# Patient Record
Sex: MALE | Race: WHITE | Employment: UNEMPLOYED | URBAN - METROPOLITAN AREA
[De-identification: names, ages, dates, MRNs, and addresses within clinical notes are randomized per-mention and may not be internally consistent; named-entity substitution may affect disease eponyms.]

---

## 2018-03-25 ENCOUNTER — HOSPITAL ENCOUNTER (EMERGENCY)
Facility: CLINIC | Age: 33
Discharge: HOME | End: 2018-03-25
Attending: EMERGENCY MEDICINE | Admitting: EMERGENCY MEDICINE

## 2018-03-25 VITALS
OXYGEN SATURATION: 99 % | BODY MASS INDEX: 29.25 KG/M2 | TEMPERATURE: 97.8 F | SYSTOLIC BLOOD PRESSURE: 183 MMHG | WEIGHT: 193 LBS | HEIGHT: 68 IN | RESPIRATION RATE: 16 BRPM | DIASTOLIC BLOOD PRESSURE: 131 MMHG

## 2018-03-25 DIAGNOSIS — R03.0 ELEVATED BLOOD PRESSURE READING WITHOUT DIAGNOSIS OF HYPERTENSION: ICD-10-CM

## 2018-03-25 DIAGNOSIS — S60.511A ABRASION OF RIGHT HAND, INITIAL ENCOUNTER: ICD-10-CM

## 2018-03-25 PROCEDURE — 99285 EMERGENCY DEPT VISIT HI MDM: CPT

## 2018-03-25 ASSESSMENT — ENCOUNTER SYMPTOMS
NERVOUS/ANXIOUS: 1
HALLUCINATIONS: 0

## 2018-03-25 NOTE — ED NOTES
Pt is noted to be pacing and wants to leave the ED. Pt's BP was noted to be very high. RN is talking to MD @ this time.

## 2018-03-25 NOTE — ED AVS SNAPSHOT
Emergency Department    [address] [place] [address]    Phone:  [phone]    Fax:  [phone]                                       Zander Blunt   MRN: [id number]    Department:   Emergency Department   Date of Visit:  3/25/2018           After Visit Summary Signature Page     I have received my discharge instructions, and my questions have been answered. I have discussed any challenges I see with this plan with the nurse or doctor.    ..........................................................................................................................................  Patient/Patient Representative Signature      ..........................................................................................................................................  Patient Representative Print Name and Relationship to Patient    ..................................................               ................................................  Date                                            Time    ..........................................................................................................................................  Reviewed by Signature/Title    ...................................................              ..............................................  Date                                                            Time

## 2018-03-25 NOTE — ED AVS SNAPSHOT
Emergency Department    [address] [facility] [address]    Phone:  [phone]    Fax:  [phone]                                       Zander Blunt   MRN: [id number]    Department:   Emergency Department   Date of Visit:  3/25/2018           Patient Information     Date Of Birth          1985        Your diagnoses for this visit were:     Abrasion of right hand, initial encounter     Elevated blood pressure reading without diagnosis of hypertension        You were seen by Sergio Saldaña DO.      Follow-up Information     Follow up with Nathaniel Russell MD In 2 days.    Specialty:  Family Practice    Contact information:    [address]  [facility] [address] [phone]          Follow up with  Emergency Department.    Specialty:  EMERGENCY MEDICINE    Why:  If symptoms worsen    Contact information:    [address] [facility] [address] [phone]        Discharge Instructions         Abrasions  Abrasions are skin scrapes. Their treatment depends on how large and deep the abrasion is.  Home care  You may be prescribed an antibiotic cream or ointment to apply to the wound. This helps prevent infection. Follow instructions when using this medicine.  General care    To care for the abrasion, do the following each day for as long as directed by your healthcare provider.    If you were given a bandage, change it once a day. If your bandage sticks to the wound, soak it in warm water until it loosens.    Wash the area with soap and warm water. You may do this in a sink or under a tub faucet or shower. Rinse off the soap. Then pat the area dry with a clean towel.    If antibiotic ointment or cream was prescribed, reapply it to the wound as directed. Cover the wound with a fresh nonstick bandage. If the bandage becomes wet or dirty, change it as soon as possible.    Some antibiotic ointments or cream can cause an allergic reaction or dermatitis.  This may cause redness, itching and or hives. If this occurs, stop using the ointment immediately and wash off any remaining ointment. You may need to take some allergy medicine to relieve symptoms.    You may use acetaminophen or ibuprofen to control pain unless another pain medicine was prescribed. Talk with your healthcare provider before using these medicines if you have chronic liver or kidney disease or ever had a stomach ulcer or GI bleeding. Don t use ibuprofen in children younger than six months old.    Most skin wounds heal within 10 days. But an infection may occur even with treatment. So it s important to watch the wound for signs of infection as listed below.  Follow-up care  Follow up with your healthcare provider, or as advised.  When to seek medical advice  Call your healthcare provider right away if any of these occur:    Fever of 100.4 F (38 C) or higher, or as directed by your healthcare provider    Increasing pain, redness, swelling, or drainage from the wound    Bleeding from the wound that does not stop after a few minutes of steady, firm pressure    Decreased ability to move any body part near the wound  Date Last Reviewed: 3/3/2017    5924-8320 [employer]. [address] [place] [address]. All rights reserved. This information is not intended as a substitute for professional medical care. Always follow your healthcare professional's instructions.      Discharge Instructions  Hypertension - High Blood Pressure    During you visit to the Emergency Department, your blood pressure was higher than the recommended blood pressure.  This may be related to stress, pain, medication or other temporary conditions. In these cases, your blood pressure may return to normal on its own. If you have a history of high blood pressure, you may need to have your provider adjust your medications. Sometimes, your high measurement here may indicate that you have developed high blood pressure  that will stay high unless it is treated. As a general rule, high blood pressure causes problems over years rather than days, weeks, or months. So, while it is important to treat blood pressure, it is rarely important to treat blood pressure immediately. Occasionally we will begin a medication in the Emergency Department; more often we will recommend close follow-up for medications with a primary doctor/clinic.    Generally, every Emergency Department visit should have a follow-up clinic visit with either a primary or a specialty clinic/provider. Please follow-up as instructed by your emergency provider today.    Return to the Emergency Department if you start to have:    A severe headache.    Chest pain.    Shortness of breath.    Weakness or numbness that affects one part of the body.    Confusion.    Vision changes.    Significant swelling of legs and/or eyes.    A reaction to any medication started in the Emergency Department.    What can I do to help myself?    Avoid alcohol.    Take any blood pressure medicine that you are prescribed.    Get a good night s sleep.    Lower your salt intake.    Exercise.    Lose weight.    Manage stress.    See your doctor regularly    If blood pressure medication was started in the Emergency Department:    The medicine may not have an immediate effect. The body and brain determine what blood pressure you have. The medicine s job is to retrain the body s  thermostat  to a lower blood pressure.    You will need to follow up with your provider to see how this medicine is working for you.  If you were given a prescription for medicine here today, be sure to read all of the information (including the package insert) that comes with your prescription.  This will include important information about the medicine, its side effects, and any warnings that you need to know about.  The pharmacist who fills the prescription can provide more information and answer questions you may have about  the medicine.  If you have questions or concerns that the pharmacist cannot address, please call or return to the Emergency Department.   Remember that you can always come back to the Emergency Department if you are not able to see your regular provider in the amount of time listed above, if you get any new symptoms, or if there is anything that worries you.      24 Hour Appointment Hotline       To make an appointment at any [facility], call [phone] ([phone]). If you don't have a family doctor or clinic, we will help you find one. [facility] are conveniently located to serve the needs of you and your family.             Review of your medicines      Our records show that you are taking the medicines listed below. If these are incorrect, please call your family doctor or clinic.        Dose / Directions Last dose taken    oxyCODONE-acetaminophen 5-325 MG per tablet   Commonly known as:  PERCOCET   Dose:  1-2 tablet   Quantity:  40 tablet        Take 1-2 tablets by mouth every 6 hours as needed for moderate to severe pain   Refills:  0                Orders Needing Specimen Collection     None      Pending Results     No orders found from 3/23/2018 to 3/26/2018.            Pending Culture Results     No orders found from 3/23/2018 to 3/26/2018.            Pending Results Instructions     If you had any lab results that were not finalized at the time of your Discharge, you can call the ED Lab Result RN at [phone]. You will be contacted by this team for any positive Lab results or changes in treatment. The nurses are available 7 days a week from 10A to 6:30P.  You can leave a message 24 hours per day and they will return your call.        Test Results From Your Hospital Stay               Clinical Quality Measure: Blood Pressure Screening     Your blood pressure was checked while you were in the emergency department today. The last reading we obtained was  BP: (!) 183/131 . Please read the  "guidelines below about what these numbers mean and what you should do about them.  If your systolic blood pressure (the top number) is less than 120 and your diastolic blood pressure (the bottom number) is less than 80, then your blood pressure is normal. There is nothing more that you need to do about it.  If your systolic blood pressure (the top number) is 120-139 or your diastolic blood pressure (the bottom number) is 80-89, your blood pressure may be higher than it should be. You should have your blood pressure rechecked within a year by a primary care provider.  If your systolic blood pressure (the top number) is 140 or greater or your diastolic blood pressure (the bottom number) is 90 or greater, you may have high blood pressure. High blood pressure is treatable, but if left untreated over time it can put you at risk for heart attack, stroke, or kidney failure. You should have your blood pressure rechecked by a primary care provider within the next 4 weeks.  If your provider in the emergency department today gave you specific instructions to follow-up with your doctor or provider even sooner than that, you should follow that instruction and not wait for up to 4 weeks for your follow-up visit.        Thank you for choosing [facility]       Thank you for choosing [facility] for your care. Our goal is always to provide you with excellent care. Hearing back from our patients is one way we can continue to improve our services. Please take a few minutes to complete the written survey that you may receive in the mail after you visit with us. Thank you!        [employer] Information     [employer] lets you send messages to your doctor, view your test results, renew your prescriptions, schedule appointments and more. To sign up, go to [email or web address] . Click on \"Log in\" on the left side of the screen, which will take you to the Welcome page. Then click on \"Sign up Now\" on the right side of the page.     You will be " asked to enter the access code listed below, as well as some personal information. Please follow the directions to create your username and password.     Your access code is: [email or web address]  Expires: 2018  1:55 AM     Your access code will  in 90 days. If you need help or a new code, please call your [facility] clinic or [phone].        Care EveryWhere ID     This is your Care EveryWhere ID. This could be used by other organizations to access your [facility] medical records  [id number]        Equal Access to Services     [facility]: Hadsheeba Deshpande, melissa sexton, carmita bundyalamanda fu, karina stauffer . So [facility] [phone].    ATENCIÓN: Si habla español, tiene a vasquez disposición servicios gratuitos de asistencia lingüística. Llame al [phone].    We comply with applicable federal civil rights laws and [place] laws. We do not discriminate on the basis of race, color, national origin, age, disability, sex, sexual orientation, or gender identity.            After Visit Summary       This is your record. Keep this with you and show to your community pharmacist(s) and doctor(s) at your next visit.
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
## 2018-03-25 NOTE — ED NOTES
"Pt asked to see staff, then states, \"I need to just go home, I'd be willing to take ativan or xanex\". MD aware.   "
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
## 2018-03-25 NOTE — ED NOTES
"Pt in hallway, \"hovering\", despite being asked to stay in room. Dr. Saldaña to bedside to eval pt.   "

## 2018-03-25 NOTE — ED NOTES
Bed: [facility]  Expected date:   Expected time:   Means of arrival:   Comments:  ems

## 2018-03-25 NOTE — DISCHARGE INSTRUCTIONS
Abrasions  Abrasions are skin scrapes. Their treatment depends on how large and deep the abrasion is.  Home care  You may be prescribed an antibiotic cream or ointment to apply to the wound. This helps prevent infection. Follow instructions when using this medicine.  General care    To care for the abrasion, do the following each day for as long as directed by your healthcare provider.    If you were given a bandage, change it once a day. If your bandage sticks to the wound, soak it in warm water until it loosens.    Wash the area with soap and warm water. You may do this in a sink or under a tub faucet or shower. Rinse off the soap. Then pat the area dry with a clean towel.    If antibiotic ointment or cream was prescribed, reapply it to the wound as directed. Cover the wound with a fresh nonstick bandage. If the bandage becomes wet or dirty, change it as soon as possible.    Some antibiotic ointments or cream can cause an allergic reaction or dermatitis. This may cause redness, itching and or hives. If this occurs, stop using the ointment immediately and wash off any remaining ointment. You may need to take some allergy medicine to relieve symptoms.    You may use acetaminophen or ibuprofen to control pain unless another pain medicine was prescribed. Talk with your healthcare provider before using these medicines if you have chronic liver or kidney disease or ever had a stomach ulcer or GI bleeding. Don t use ibuprofen in children younger than six months old.    Most skin wounds heal within 10 days. But an infection may occur even with treatment. So it s important to watch the wound for signs of infection as listed below.  Follow-up care  Follow up with your healthcare provider, or as advised.  When to seek medical advice  Call your healthcare provider right away if any of these occur:    Fever of 100.4 F (38 C) or higher, or as directed by your healthcare provider    Increasing pain, redness, swelling, or  drainage from the wound    Bleeding from the wound that does not stop after a few minutes of steady, firm pressure    Decreased ability to move any body part near the wound  Date Last Reviewed: 3/3/2017    3082-9775 [employer]. [address] [place] [address], [place] [address]. All rights reserved. This information is not intended as a substitute for professional medical care. Always follow your healthcare professional's instructions.      Discharge Instructions  Hypertension - High Blood Pressure    During you visit to the Emergency Department, your blood pressure was higher than the recommended blood pressure.  This may be related to stress, pain, medication or other temporary conditions. In these cases, your blood pressure may return to normal on its own. If you have a history of high blood pressure, you may need to have your provider adjust your medications. Sometimes, your high measurement here may indicate that you have developed high blood pressure that will stay high unless it is treated. As a general rule, high blood pressure causes problems over years rather than days, weeks, or months. So, while it is important to treat blood pressure, it is rarely important to treat blood pressure immediately. Occasionally we will begin a medication in the Emergency Department; more often we will recommend close follow-up for medications with a primary doctor/clinic.    Generally, every Emergency Department visit should have a follow-up clinic visit with either a primary or a specialty clinic/provider. Please follow-up as instructed by your emergency provider today.    Return to the Emergency Department if you start to have:    A severe headache.    Chest pain.    Shortness of breath.    Weakness or numbness that affects one part of the body.    Confusion.    Vision changes.    Significant swelling of legs and/or eyes.    A reaction to any medication started in the Emergency Department.    What can I do to help  myself?    Avoid alcohol.    Take any blood pressure medicine that you are prescribed.    Get a good night s sleep.    Lower your salt intake.    Exercise.    Lose weight.    Manage stress.    See your doctor regularly    If blood pressure medication was started in the Emergency Department:    The medicine may not have an immediate effect. The body and brain determine what blood pressure you have. The medicine s job is to retrain the body s  thermostat  to a lower blood pressure.    You will need to follow up with your provider to see how this medicine is working for you.  If you were given a prescription for medicine here today, be sure to read all of the information (including the package insert) that comes with your prescription.  This will include important information about the medicine, its side effects, and any warnings that you need to know about.  The pharmacist who fills the prescription can provide more information and answer questions you may have about the medicine.  If you have questions or concerns that the pharmacist cannot address, please call or return to the Emergency Department.   Remember that you can always come back to the Emergency Department if you are not able to see your regular provider in the amount of time listed above, if you get any new symptoms, or if there is anything that worries you.

## 2018-03-25 NOTE — ED NOTES
Pt states he is willing to change after he speaks with MD. Pt wearing white tshirt, khaki pants and white socks.

## 2018-03-25 NOTE — ED NOTES
"Pt is noted to be very tearful and is sobbing about his children. Pt is otherwise calm, cooperative and pleasant. Pt is asking \"can I take a leak\" Pt was walked to restroom via ERT. Pt is refusing to leave a urine or to have me look @ his hand where pt admitted to punching the wall with his R fist; pt is noted to have lots of dried blood. Pt is refusing all cares but is however requesting Ativan  "

## 2021-07-09 ENCOUNTER — HOSPITAL ENCOUNTER (EMERGENCY)
Facility: CLINIC | Age: 36
Discharge: HOME | End: 2021-07-09
Attending: PHYSICIAN ASSISTANT | Admitting: PHYSICIAN ASSISTANT
Payer: COMMERCIAL

## 2021-07-09 VITALS
BODY MASS INDEX: 29.12 KG/M2 | DIASTOLIC BLOOD PRESSURE: 119 MMHG | TEMPERATURE: 98.1 F | RESPIRATION RATE: 16 BRPM | SYSTOLIC BLOOD PRESSURE: 170 MMHG | HEIGHT: 72 IN | OXYGEN SATURATION: 98 % | WEIGHT: 215 LBS | HEART RATE: 72 BPM

## 2021-07-09 DIAGNOSIS — S00.252A: ICD-10-CM

## 2021-07-09 DIAGNOSIS — I10 HTN (HYPERTENSION): ICD-10-CM

## 2021-07-09 PROCEDURE — 99283 EMERGENCY DEPT VISIT LOW MDM: CPT

## 2021-07-09 PROCEDURE — 250N000009 HC RX 250: Performed by: EMERGENCY MEDICINE

## 2021-07-09 PROCEDURE — 250N000009 HC RX 250: Performed by: PHYSICIAN ASSISTANT

## 2021-07-09 PROCEDURE — 90715 TDAP VACCINE 7 YRS/> IM: CPT | Performed by: PHYSICIAN ASSISTANT

## 2021-07-09 PROCEDURE — 65210 REMOVE FOREIGN BODY FROM EYE: CPT

## 2021-07-09 PROCEDURE — 250N000011 HC RX IP 250 OP 636: Performed by: PHYSICIAN ASSISTANT

## 2021-07-09 PROCEDURE — 90471 IMMUNIZATION ADMIN: CPT | Performed by: PHYSICIAN ASSISTANT

## 2021-07-09 RX ORDER — PROPARACAINE HYDROCHLORIDE 5 MG/ML
1 SOLUTION/ DROPS OPHTHALMIC ONCE
Status: COMPLETED | OUTPATIENT
Start: 2021-07-09 | End: 2021-07-09

## 2021-07-09 RX ADMIN — CLOSTRIDIUM TETANI TOXOID ANTIGEN (FORMALDEHYDE INACTIVATED), CORYNEBACTERIUM DIPHTHERIAE TOXOID ANTIGEN (FORMALDEHYDE INACTIVATED), BORDETELLA PERTUSSIS TOXOID ANTIGEN (GLUTARALDEHYDE INACTIVATED), BORDETELLA PERTUSSIS FILAMENTOUS HEMAGGLUTININ ANTIGEN (FORMALDEHYDE INACTIVATED), BORDETELLA PERTUSSIS PERTACTIN ANTIGEN, AND BORDETELLA PERTUSSIS FIMBRIAE 2/3 ANTIGEN 0.5 ML: 5; 2; 2.5; 5; 3; 5 INJECTION, SUSPENSION INTRAMUSCULAR at 11:03

## 2021-07-09 RX ADMIN — FLUORESCEIN SODIUM 600 MCG: 0.6 STRIP OPHTHALMIC at 10:20

## 2021-07-09 RX ADMIN — PROPARACAINE HYDROCHLORIDE 1 DROP: 5 SOLUTION/ DROPS OPHTHALMIC at 09:47

## 2021-07-09 ASSESSMENT — VISUAL ACUITY
OD: 20/30
OS: 20/60

## 2021-07-09 ASSESSMENT — MIFFLIN-ST. JEOR: SCORE: 1938.23

## 2021-07-09 ASSESSMENT — ENCOUNTER SYMPTOMS
EYE PAIN: 1
EYE REDNESS: 1

## 2021-07-09 NOTE — ED PROVIDER NOTES
Emergency Department Attending Supervision Note  7/9/2021  10:46 AM      I evaluated this patient in conjunction with Brendon Pineda PA-C      Briefly, the patient presented with discomfort to his left lower eyelid after getting debris into his left eye while cutting tiles with a skill saw yesterday.  He flushed his eye at the time but has had significant discomfort.    On my exam, there is an extremely small light-colored foreign body in the conjunctiva of the left inferolateral eyelid, which cannot be removed on exam.  No evidence of open globe.  No fluorescein uptake.  Visual acuity reported to me as 20/30 in the right and 20/60 in the left.    My impression is that he would benefit from prompt evaluation by an ophthalmologist.  Both RAÚL Campbell, and I attempted to remove what seems to be the small foreign body without success.  I used a 27-gauge needle with a lateral approach.  I explained to the patient that I felt further attempts at emergency department foreign body removal were not appropriate out of concern for causing iatrogenic injury.  Instead, we spoke with the patient's ophthalmology clinic who was able to see him early this afternoon for more comprehensive eye assessment.  He agrees with this plan and was discharged.      Diagnosis    ICD-10-CM    1. Acute foreign body of eyelid, left, initial encounter  S00.252A    2. HTN (hypertension)  I10          Carlos Manuel Nails MD    This note was completed in part using Dragon voice recognition software. Although reviewed after completion, some word and grammatical errors may occur.           Donnell aNils MD  07/09/21 1606

## 2021-07-09 NOTE — ED PROVIDER NOTES
History   Chief Complaint:  Eye Pain     HPI   Zander Blunt is a 35 year old male who presents to the ED with left eye pain and blurry vision since yesterday after getting a possible foreign body into the eye while cutting some tiles with a skill saw. Per chart review, the patient saw an ophthalmologist earlier this morning where he reported that his girlfriend was able to remove something from that eye after the incident. The pain has been so intense that he had difficulty sleeping last night. Here, the pain is noted to resolve with Alcaine. Other than some redness and watering, he otherwise feels well and does not have any other complaints at this time. Visual acuity here is 20/30 in the right and 20/60 in the left. Per immunization records, his last tetanus shot was in 2011.     Review of Systems   Eyes: Positive for pain (left), redness (left) and visual disturbance (blurry vision).   All other systems reviewed and are negative.    Allergies:  Quetiapine    Medications:  Albuterol inhaler  Desyrel  Wellbutrin  Xanax    Past Medical History:    Acute renal failure  ADHD  Adjustment disorder  Anxiety  Cellulitis of right leg  Chemical dependency  Depression  External hemorrhoid   Hypertension  Iron deficiency anemia  Panic disorder  Rhabdomyolosis  Substance abuse    Past Surgical History:    Flexible sigmoidoscopy  Right foot glass removal  ORIF right ankle  ORIF right arm     Family History:    Hypertension   Stroke    Social History:  The patient presented alone.    Physical Exam     Patient Vitals for the past 24 hrs:   BP Temp Temp src Pulse Resp SpO2 Height Weight   07/09/21 1100 (!) 170/119 -- -- 72 -- 98 % -- --   07/09/21 0943 (!) 177/126 98.1  F (36.7  C) Oral 85 16 97 % 1.829 m (6') 97.5 kg (215 lb)       Physical Exam  Constitutional: Pleasant. Cooperative. Non-toxic appearing.  HEENT: Pupils equally round and reactive. EOMI. Mild left conjunctival injection. No discharge. Lids everted, no foreign  bodies seen. Mild L lower lid edema. Normal anterior chamber without cells or flare. Fluorescein exam reveals no uptake, no dendritic lesions. Negative Vinay sign. VA 20/30 in right eye, 20/60 in left eye. There does appear to be a white foreign body noted in L lower lateral lid.  Respiratory: Normal respiratory effort, lungs are clear bilaterally.  Skin: Normal, without rash. No periorbital erythema.  Neurologic: Cranial nerves grossly intact. Alert.  Nursing notes and vital signs reviewed.     Emergency Department Course     Emergency Department Course:    Reviewed:  1009 I reviewed nursing notes, vitals and past medical history.    Assessments:  1014 I obtained history and examined the patient as noted above.   1048 Dr. Nails evaluated the patient in conjunction with me.  1109 I rechecked the patient and discussed his upcoming ophthalmology appointment.    Consults:   1055 I spoke with [place] [id number] [facility] regarding care coordination.    Interventions:  0947: Alcaine 0.5% opthalmic solution 1 drop  1020: Fluorescein 600 mcg   1103: Adacel 0.5 mL IM    Disposition:  The patient was discharged to home.       Impression & Plan   Medical Decision Making:  Zander Blunt is a 35 year old male who presents to the ED for evaluation of eye pain. Patient reports that he was using a tile saw yesterday and felt foreign body enter his left eye.  He has since had pain since then, causing difficulty sleeping secondary to pain as well as some vision changes.  See HPI as above for additional details.  Vitals and physical exam as above.  Slit-lamp exam performed without evidence for corneal abrasion on fluorescein exam.  There is evidence for a small white speck noted to his left lower lateral eyelid.  An attempt was made to remove this foreign body without success.  Given persistence of symptoms and inability to remove foreign body, ophthalmology consult was indicated.  Patient in agreement.  I did discuss the  case with ophthalmology clinic, who will see patient at 1300 today.  Patient in agreement with this plan.  Tetanus updated.  We did discuss patient's marked hypertension.  This appears to be an ongoing issue, and patient states this to be the case.  Advised and follow-up with PCP with respect to this.  Patient reports understanding. Discussed reasons to return. All questions answered. Patient discharged to home in stable condition.    Diagnosis:    ICD-10-CM    1. Acute foreign body of eyelid, left, initial encounter  S00.252A    2. HTN (hypertension)  I10        Discharge Medications:  New Prescriptions    No medications on file       Scribe Disclosure:  I, Kasia Cooper, am serving as a scribe at 10:11 AM on 7/9/2021 to document services personally performed by Brendon Pineda PA-C based on my observations and the provider's statements to me.     This record was created at least in part using electronic voice recognition software, so please excuse any typographical errors.               Brendon Pineda PA-C  07/09/21 1130

## 2021-09-22 ENCOUNTER — LAB REQUISITION (OUTPATIENT)
Dept: LAB | Facility: CLINIC | Age: 36
End: 2021-09-22
Payer: COMMERCIAL

## 2021-09-22 DIAGNOSIS — M79.673 PAIN IN UNSPECIFIED FOOT: ICD-10-CM

## 2021-09-22 LAB
APPEARANCE FLD: ABNORMAL
COLOR FLD: YELLOW
CRYSTALS SNV MICRO: NORMAL
LYMPHOCYTES NFR FLD MANUAL: 1 %
MONOS+MACROS NFR FLD MANUAL: 1 %
NEUTS BAND NFR FLD MANUAL: 98 %
WBC # FLD AUTO: ABNORMAL /UL

## 2021-09-22 PROCEDURE — 87186 SC STD MICRODIL/AGAR DIL: CPT | Mod: ORL | Performed by: ORTHOPAEDIC SURGERY

## 2021-09-22 PROCEDURE — 89060 EXAM SYNOVIAL FLUID CRYSTALS: CPT | Mod: ORL | Performed by: ORTHOPAEDIC SURGERY

## 2021-09-22 PROCEDURE — 89051 BODY FLUID CELL COUNT: CPT | Mod: ORL | Performed by: ORTHOPAEDIC SURGERY

## 2021-09-22 PROCEDURE — 87075 CULTR BACTERIA EXCEPT BLOOD: CPT | Mod: ORL | Performed by: ORTHOPAEDIC SURGERY

## 2021-09-23 ENCOUNTER — ANESTHESIA EVENT (OUTPATIENT)
Dept: SURGERY | Facility: CLINIC | Age: 36
End: 2021-09-23
Payer: COMMERCIAL

## 2021-09-23 ENCOUNTER — ANESTHESIA (OUTPATIENT)
Dept: SURGERY | Facility: CLINIC | Age: 36
End: 2021-09-23
Payer: COMMERCIAL

## 2021-09-23 ENCOUNTER — HOSPITAL ENCOUNTER (INPATIENT)
Facility: CLINIC | Age: 36
LOS: 17 days | Discharge: HOME | End: 2021-10-10
Attending: EMERGENCY MEDICINE | Admitting: INTERNAL MEDICINE
Payer: COMMERCIAL

## 2021-09-23 DIAGNOSIS — M00.071 STAPHYLOCOCCAL ARTHRITIS OF RIGHT FOOT (H): Primary | ICD-10-CM

## 2021-09-23 LAB
ANION GAP SERPL CALCULATED.3IONS-SCNC: 2 MMOL/L (ref 3–14)
BUN SERPL-MCNC: 11 MG/DL (ref 7–30)
CALCIUM SERPL-MCNC: 9.1 MG/DL (ref 8.5–10.1)
CHLORIDE BLD-SCNC: 105 MMOL/L (ref 94–109)
CO2 SERPL-SCNC: 30 MMOL/L (ref 20–32)
CREAT SERPL-MCNC: 0.78 MG/DL (ref 0.66–1.25)
CRP SERPL-MCNC: 104 MG/L (ref 0–8)
ERYTHROCYTE [DISTWIDTH] IN BLOOD BY AUTOMATED COUNT: 13 % (ref 10–15)
ERYTHROCYTE [SEDIMENTATION RATE] IN BLOOD BY WESTERGREN METHOD: 78 MM/HR (ref 0–15)
GFR SERPL CREATININE-BSD FRML MDRD: >90 ML/MIN/1.73M2
GLUCOSE BLD-MCNC: 115 MG/DL (ref 70–99)
GRAM STAIN RESULT: ABNORMAL
GRAM STAIN RESULT: ABNORMAL
HCT VFR BLD AUTO: 37.4 % (ref 40–53)
HGB BLD-MCNC: 12.3 G/DL (ref 13.3–17.7)
MCH RBC QN AUTO: 27.8 PG (ref 26.5–33)
MCHC RBC AUTO-ENTMCNC: 32.9 G/DL (ref 31.5–36.5)
MCV RBC AUTO: 84 FL (ref 78–100)
PLATELET # BLD AUTO: 252 10E3/UL (ref 150–450)
POTASSIUM BLD-SCNC: 4.1 MMOL/L (ref 3.4–5.3)
RBC # BLD AUTO: 4.43 10E6/UL (ref 4.4–5.9)
SARS-COV-2 RNA RESP QL NAA+PROBE: NEGATIVE
SODIUM SERPL-SCNC: 137 MMOL/L (ref 133–144)
WBC # BLD AUTO: 5.8 10E3/UL (ref 4–11)

## 2021-09-23 PROCEDURE — 250N000011 HC RX IP 250 OP 636: Performed by: ANESTHESIOLOGY

## 2021-09-23 PROCEDURE — 250N000009 HC RX 250: Performed by: ORTHOPAEDIC SURGERY

## 2021-09-23 PROCEDURE — 250N000011 HC RX IP 250 OP 636: Performed by: NURSE ANESTHETIST, CERTIFIED REGISTERED

## 2021-09-23 PROCEDURE — 87070 CULTURE OTHR SPECIMN AEROBIC: CPT | Performed by: ORTHOPAEDIC SURGERY

## 2021-09-23 PROCEDURE — 250N000011 HC RX IP 250 OP 636: Performed by: ORTHOPAEDIC SURGERY

## 2021-09-23 PROCEDURE — 250N000013 HC RX MED GY IP 250 OP 250 PS 637: Performed by: ORTHOPAEDIC SURGERY

## 2021-09-23 PROCEDURE — C9803 HOPD COVID-19 SPEC COLLECT: HCPCS

## 2021-09-23 PROCEDURE — 360N000075 HC SURGERY LEVEL 2, PER MIN: Performed by: ORTHOPAEDIC SURGERY

## 2021-09-23 PROCEDURE — 258N000003 HC RX IP 258 OP 636: Performed by: NURSE ANESTHETIST, CERTIFIED REGISTERED

## 2021-09-23 PROCEDURE — 99285 EMERGENCY DEPT VISIT HI MDM: CPT

## 2021-09-23 PROCEDURE — 999N000141 HC STATISTIC PRE-PROCEDURE NURSING ASSESSMENT: Performed by: ORTHOPAEDIC SURGERY

## 2021-09-23 PROCEDURE — 87635 SARS-COV-2 COVID-19 AMP PRB: CPT | Performed by: EMERGENCY MEDICINE

## 2021-09-23 PROCEDURE — 250N000025 HC SEVOFLURANE, PER MIN: Performed by: ORTHOPAEDIC SURGERY

## 2021-09-23 PROCEDURE — 85652 RBC SED RATE AUTOMATED: CPT | Performed by: ORTHOPAEDIC SURGERY

## 2021-09-23 PROCEDURE — 250N000009 HC RX 250: Performed by: NURSE ANESTHETIST, CERTIFIED REGISTERED

## 2021-09-23 PROCEDURE — 85014 HEMATOCRIT: CPT | Performed by: ORTHOPAEDIC SURGERY

## 2021-09-23 PROCEDURE — 87075 CULTR BACTERIA EXCEPT BLOOD: CPT | Performed by: ORTHOPAEDIC SURGERY

## 2021-09-23 PROCEDURE — 36415 COLL VENOUS BLD VENIPUNCTURE: CPT | Performed by: ORTHOPAEDIC SURGERY

## 2021-09-23 PROCEDURE — 120N000001 HC R&B MED SURG/OB

## 2021-09-23 PROCEDURE — 370N000017 HC ANESTHESIA TECHNICAL FEE, PER MIN: Performed by: ORTHOPAEDIC SURGERY

## 2021-09-23 PROCEDURE — 80048 BASIC METABOLIC PNL TOTAL CA: CPT | Performed by: ORTHOPAEDIC SURGERY

## 2021-09-23 PROCEDURE — 272N000001 HC OR GENERAL SUPPLY STERILE: Performed by: ORTHOPAEDIC SURGERY

## 2021-09-23 PROCEDURE — 0SBM0ZZ EXCISION OF RIGHT METATARSAL-PHALANGEAL JOINT, OPEN APPROACH: ICD-10-PCS | Performed by: ORTHOPAEDIC SURGERY

## 2021-09-23 PROCEDURE — 86140 C-REACTIVE PROTEIN: CPT | Performed by: ORTHOPAEDIC SURGERY

## 2021-09-23 PROCEDURE — 258N000001 HC RX 258: Performed by: ORTHOPAEDIC SURGERY

## 2021-09-23 PROCEDURE — 87040 BLOOD CULTURE FOR BACTERIA: CPT | Performed by: ORTHOPAEDIC SURGERY

## 2021-09-23 PROCEDURE — 250N000013 HC RX MED GY IP 250 OP 250 PS 637: Performed by: INTERNAL MEDICINE

## 2021-09-23 PROCEDURE — 87205 SMEAR GRAM STAIN: CPT | Performed by: ORTHOPAEDIC SURGERY

## 2021-09-23 PROCEDURE — 710N000009 HC RECOVERY PHASE 1, LEVEL 1, PER MIN: Performed by: ORTHOPAEDIC SURGERY

## 2021-09-23 RX ORDER — PROPOFOL 10 MG/ML
INJECTION, EMULSION INTRAVENOUS PRN
Status: DISCONTINUED | OUTPATIENT
Start: 2021-09-23 | End: 2021-09-23

## 2021-09-23 RX ORDER — ONDANSETRON 4 MG/1
4 TABLET, ORALLY DISINTEGRATING ORAL EVERY 6 HOURS PRN
Status: DISCONTINUED | OUTPATIENT
Start: 2021-09-23 | End: 2021-09-23

## 2021-09-23 RX ORDER — SODIUM CHLORIDE, SODIUM LACTATE, POTASSIUM CHLORIDE, CALCIUM CHLORIDE 600; 310; 30; 20 MG/100ML; MG/100ML; MG/100ML; MG/100ML
INJECTION, SOLUTION INTRAVENOUS CONTINUOUS
Status: DISCONTINUED | OUTPATIENT
Start: 2021-09-23 | End: 2021-09-23

## 2021-09-23 RX ORDER — DIPHENHYDRAMINE HCL 25 MG
25 CAPSULE ORAL EVERY 6 HOURS PRN
Status: DISCONTINUED | OUTPATIENT
Start: 2021-09-23 | End: 2021-10-10 | Stop reason: HOSPADM

## 2021-09-23 RX ORDER — ONDANSETRON 2 MG/ML
INJECTION INTRAMUSCULAR; INTRAVENOUS PRN
Status: DISCONTINUED | OUTPATIENT
Start: 2021-09-23 | End: 2021-09-23

## 2021-09-23 RX ORDER — ONDANSETRON 2 MG/ML
4 INJECTION INTRAMUSCULAR; INTRAVENOUS EVERY 30 MIN PRN
Status: DISCONTINUED | OUTPATIENT
Start: 2021-09-23 | End: 2021-09-23 | Stop reason: HOSPADM

## 2021-09-23 RX ORDER — FENTANYL CITRATE 50 UG/ML
50 INJECTION, SOLUTION INTRAMUSCULAR; INTRAVENOUS ONCE
Status: COMPLETED | OUTPATIENT
Start: 2021-09-23 | End: 2021-09-23

## 2021-09-23 RX ORDER — MEPERIDINE HYDROCHLORIDE 25 MG/ML
12.5 INJECTION INTRAMUSCULAR; INTRAVENOUS; SUBCUTANEOUS EVERY 5 MIN PRN
Status: DISCONTINUED | OUTPATIENT
Start: 2021-09-23 | End: 2021-09-23 | Stop reason: HOSPADM

## 2021-09-23 RX ORDER — ONDANSETRON 4 MG/1
4 TABLET, ORALLY DISINTEGRATING ORAL EVERY 6 HOURS PRN
Status: DISCONTINUED | OUTPATIENT
Start: 2021-09-23 | End: 2021-10-10 | Stop reason: HOSPADM

## 2021-09-23 RX ORDER — BUPIVACAINE HYDROCHLORIDE 2.5 MG/ML
INJECTION, SOLUTION INFILTRATION; PERINEURAL PRN
Status: DISCONTINUED | OUTPATIENT
Start: 2021-09-23 | End: 2021-09-23 | Stop reason: HOSPADM

## 2021-09-23 RX ORDER — LABETALOL HYDROCHLORIDE 5 MG/ML
5 INJECTION, SOLUTION INTRAVENOUS EVERY 5 MIN PRN
Status: DISCONTINUED | OUTPATIENT
Start: 2021-09-23 | End: 2021-09-23 | Stop reason: HOSPADM

## 2021-09-23 RX ORDER — HYDROXYZINE HYDROCHLORIDE 25 MG/1
25 TABLET, FILM COATED ORAL EVERY 6 HOURS PRN
Status: DISCONTINUED | OUTPATIENT
Start: 2021-09-23 | End: 2021-09-23 | Stop reason: HOSPADM

## 2021-09-23 RX ORDER — CEFAZOLIN SODIUM 2 G/100ML
2 INJECTION, SOLUTION INTRAVENOUS EVERY 8 HOURS
Status: COMPLETED | OUTPATIENT
Start: 2021-09-24 | End: 2021-09-24

## 2021-09-23 RX ORDER — CEFAZOLIN SODIUM 2 G/100ML
2 INJECTION, SOLUTION INTRAVENOUS
Status: DISCONTINUED | OUTPATIENT
Start: 2021-09-23 | End: 2021-09-23 | Stop reason: HOSPADM

## 2021-09-23 RX ORDER — OXYCODONE HYDROCHLORIDE 5 MG/1
5 TABLET ORAL EVERY 4 HOURS PRN
Status: DISCONTINUED | OUTPATIENT
Start: 2021-09-23 | End: 2021-09-23 | Stop reason: HOSPADM

## 2021-09-23 RX ORDER — ACETAMINOPHEN 325 MG/1
975 TABLET ORAL EVERY 8 HOURS
Status: DISCONTINUED | OUTPATIENT
Start: 2021-09-23 | End: 2021-09-23

## 2021-09-23 RX ORDER — LIDOCAINE HYDROCHLORIDE 20 MG/ML
INJECTION, SOLUTION INFILTRATION; PERINEURAL PRN
Status: DISCONTINUED | OUTPATIENT
Start: 2021-09-23 | End: 2021-09-23

## 2021-09-23 RX ORDER — ALPRAZOLAM 1 MG
1 TABLET ORAL 2 TIMES DAILY PRN
COMMUNITY

## 2021-09-23 RX ORDER — IBUPROFEN 600 MG/1
600 TABLET, FILM COATED ORAL EVERY 6 HOURS PRN
Status: DISCONTINUED | OUTPATIENT
Start: 2021-09-23 | End: 2021-10-10 | Stop reason: HOSPADM

## 2021-09-23 RX ORDER — CEFAZOLIN SODIUM 2 G/100ML
2 INJECTION, SOLUTION INTRAVENOUS SEE ADMIN INSTRUCTIONS
Status: DISCONTINUED | OUTPATIENT
Start: 2021-09-23 | End: 2021-09-23 | Stop reason: HOSPADM

## 2021-09-23 RX ORDER — SODIUM CHLORIDE, SODIUM LACTATE, POTASSIUM CHLORIDE, CALCIUM CHLORIDE 600; 310; 30; 20 MG/100ML; MG/100ML; MG/100ML; MG/100ML
INJECTION, SOLUTION INTRAVENOUS CONTINUOUS
Status: DISCONTINUED | OUTPATIENT
Start: 2021-09-23 | End: 2021-09-23 | Stop reason: HOSPADM

## 2021-09-23 RX ORDER — ONDANSETRON 2 MG/ML
4 INJECTION INTRAMUSCULAR; INTRAVENOUS EVERY 6 HOURS PRN
Status: DISCONTINUED | OUTPATIENT
Start: 2021-09-23 | End: 2021-10-10 | Stop reason: HOSPADM

## 2021-09-23 RX ORDER — BISACODYL 10 MG
10 SUPPOSITORY, RECTAL RECTAL DAILY PRN
Status: DISCONTINUED | OUTPATIENT
Start: 2021-09-23 | End: 2021-10-10 | Stop reason: HOSPADM

## 2021-09-23 RX ORDER — DOXYCYCLINE HYCLATE 100 MG
100 TABLET ORAL 2 TIMES DAILY
Status: ON HOLD | COMMUNITY
End: 2021-09-23

## 2021-09-23 RX ORDER — AMOXICILLIN 250 MG
1 CAPSULE ORAL 2 TIMES DAILY
Status: DISCONTINUED | OUTPATIENT
Start: 2021-09-23 | End: 2021-10-10 | Stop reason: HOSPADM

## 2021-09-23 RX ORDER — FENTANYL CITRATE 50 UG/ML
INJECTION, SOLUTION INTRAMUSCULAR; INTRAVENOUS PRN
Status: DISCONTINUED | OUTPATIENT
Start: 2021-09-23 | End: 2021-09-23

## 2021-09-23 RX ORDER — TRAZODONE HYDROCHLORIDE 50 MG/1
50-150 TABLET, FILM COATED ORAL
COMMUNITY

## 2021-09-23 RX ORDER — PROCHLORPERAZINE MALEATE 5 MG
10 TABLET ORAL EVERY 6 HOURS PRN
Status: DISCONTINUED | OUTPATIENT
Start: 2021-09-23 | End: 2021-10-10 | Stop reason: HOSPADM

## 2021-09-23 RX ORDER — ONDANSETRON 4 MG/1
4 TABLET, ORALLY DISINTEGRATING ORAL EVERY 30 MIN PRN
Status: DISCONTINUED | OUTPATIENT
Start: 2021-09-23 | End: 2021-09-23 | Stop reason: HOSPADM

## 2021-09-23 RX ORDER — ALBUTEROL SULFATE 90 UG/1
1-2 AEROSOL, METERED RESPIRATORY (INHALATION) EVERY 4 HOURS PRN
COMMUNITY

## 2021-09-23 RX ORDER — SODIUM CHLORIDE, SODIUM LACTATE, POTASSIUM CHLORIDE, CALCIUM CHLORIDE 600; 310; 30; 20 MG/100ML; MG/100ML; MG/100ML; MG/100ML
INJECTION, SOLUTION INTRAVENOUS CONTINUOUS PRN
Status: DISCONTINUED | OUTPATIENT
Start: 2021-09-23 | End: 2021-09-23

## 2021-09-23 RX ORDER — LIDOCAINE 40 MG/G
CREAM TOPICAL
Status: DISCONTINUED | OUTPATIENT
Start: 2021-09-23 | End: 2021-09-23

## 2021-09-23 RX ORDER — ALBUTEROL SULFATE 90 UG/1
1-2 AEROSOL, METERED RESPIRATORY (INHALATION) EVERY 4 HOURS PRN
Status: DISCONTINUED | OUTPATIENT
Start: 2021-09-23 | End: 2021-10-10 | Stop reason: HOSPADM

## 2021-09-23 RX ORDER — DEXAMETHASONE SODIUM PHOSPHATE 4 MG/ML
INJECTION, SOLUTION INTRA-ARTICULAR; INTRALESIONAL; INTRAMUSCULAR; INTRAVENOUS; SOFT TISSUE PRN
Status: DISCONTINUED | OUTPATIENT
Start: 2021-09-23 | End: 2021-09-23

## 2021-09-23 RX ORDER — ONDANSETRON 2 MG/ML
4 INJECTION INTRAMUSCULAR; INTRAVENOUS EVERY 6 HOURS PRN
Status: DISCONTINUED | OUTPATIENT
Start: 2021-09-23 | End: 2021-09-23

## 2021-09-23 RX ORDER — ACETAMINOPHEN 325 MG/1
650 TABLET ORAL EVERY 4 HOURS PRN
Status: DISCONTINUED | OUTPATIENT
Start: 2021-09-26 | End: 2021-10-10 | Stop reason: HOSPADM

## 2021-09-23 RX ORDER — FENTANYL CITRATE 0.05 MG/ML
50 INJECTION, SOLUTION INTRAMUSCULAR; INTRAVENOUS EVERY 5 MIN PRN
Status: DISCONTINUED | OUTPATIENT
Start: 2021-09-23 | End: 2021-09-23 | Stop reason: HOSPADM

## 2021-09-23 RX ORDER — KETOROLAC TROMETHAMINE 15 MG/ML
30 INJECTION, SOLUTION INTRAMUSCULAR; INTRAVENOUS EVERY 6 HOURS PRN
Status: DISCONTINUED | OUTPATIENT
Start: 2021-09-23 | End: 2021-09-23

## 2021-09-23 RX ORDER — ALPRAZOLAM 1 MG
1 TABLET ORAL 2 TIMES DAILY PRN
Status: DISCONTINUED | OUTPATIENT
Start: 2021-09-23 | End: 2021-09-29

## 2021-09-23 RX ORDER — HYDROMORPHONE HYDROCHLORIDE 1 MG/ML
0.3 INJECTION, SOLUTION INTRAMUSCULAR; INTRAVENOUS; SUBCUTANEOUS EVERY 5 MIN PRN
Status: DISCONTINUED | OUTPATIENT
Start: 2021-09-23 | End: 2021-09-23 | Stop reason: HOSPADM

## 2021-09-23 RX ORDER — TRAZODONE HYDROCHLORIDE 50 MG/1
50-150 TABLET, FILM COATED ORAL
Status: DISCONTINUED | OUTPATIENT
Start: 2021-09-23 | End: 2021-10-10 | Stop reason: HOSPADM

## 2021-09-23 RX ORDER — POLYETHYLENE GLYCOL 3350 17 G/17G
17 POWDER, FOR SOLUTION ORAL DAILY
Status: DISCONTINUED | OUTPATIENT
Start: 2021-09-24 | End: 2021-10-10 | Stop reason: HOSPADM

## 2021-09-23 RX ORDER — DOXYCYCLINE 100 MG/1
100 CAPSULE ORAL 2 TIMES DAILY
Status: ON HOLD | COMMUNITY
End: 2021-09-26

## 2021-09-23 RX ORDER — ACETAMINOPHEN 325 MG/1
975 TABLET ORAL EVERY 8 HOURS
Status: DISCONTINUED | OUTPATIENT
Start: 2021-09-23 | End: 2021-10-10 | Stop reason: HOSPADM

## 2021-09-23 RX ORDER — MAGNESIUM HYDROXIDE 1200 MG/15ML
LIQUID ORAL PRN
Status: DISCONTINUED | OUTPATIENT
Start: 2021-09-23 | End: 2021-09-23 | Stop reason: HOSPADM

## 2021-09-23 RX ORDER — METHOCARBAMOL 750 MG/1
750 TABLET, FILM COATED ORAL EVERY 6 HOURS PRN
Status: DISCONTINUED | OUTPATIENT
Start: 2021-09-23 | End: 2021-10-10 | Stop reason: HOSPADM

## 2021-09-23 RX ORDER — LIDOCAINE 40 MG/G
CREAM TOPICAL
Status: DISCONTINUED | OUTPATIENT
Start: 2021-09-23 | End: 2021-10-10 | Stop reason: HOSPADM

## 2021-09-23 RX ADMIN — PROPOFOL 350 MG: 10 INJECTION, EMULSION INTRAVENOUS at 19:36

## 2021-09-23 RX ADMIN — MIDAZOLAM 2 MG: 1 INJECTION INTRAMUSCULAR; INTRAVENOUS at 19:28

## 2021-09-23 RX ADMIN — ONDANSETRON 4 MG: 2 INJECTION INTRAMUSCULAR; INTRAVENOUS at 19:48

## 2021-09-23 RX ADMIN — FENTANYL CITRATE 100 MCG: 50 INJECTION, SOLUTION INTRAMUSCULAR; INTRAVENOUS at 19:36

## 2021-09-23 RX ADMIN — SODIUM CHLORIDE, POTASSIUM CHLORIDE, SODIUM LACTATE AND CALCIUM CHLORIDE: 600; 310; 30; 20 INJECTION, SOLUTION INTRAVENOUS at 19:28

## 2021-09-23 RX ADMIN — FENTANYL CITRATE 50 MCG: 50 INJECTION, SOLUTION INTRAMUSCULAR; INTRAVENOUS at 20:25

## 2021-09-23 RX ADMIN — CEFAZOLIN SODIUM 2 G: 2 INJECTION, SOLUTION INTRAVENOUS at 19:46

## 2021-09-23 RX ADMIN — SUCCINYLCHOLINE CHLORIDE 100 MG: 20 INJECTION, SOLUTION INTRAMUSCULAR; INTRAVENOUS; PARENTERAL at 19:36

## 2021-09-23 RX ADMIN — SENNOSIDES AND DOCUSATE SODIUM 1 TABLET: 8.6; 5 TABLET ORAL at 22:56

## 2021-09-23 RX ADMIN — LIDOCAINE HYDROCHLORIDE 100 MG: 20 INJECTION, SOLUTION INFILTRATION; PERINEURAL at 19:36

## 2021-09-23 RX ADMIN — ACETAMINOPHEN 975 MG: 325 TABLET, FILM COATED ORAL at 22:56

## 2021-09-23 RX ADMIN — DEXAMETHASONE SODIUM PHOSPHATE 4 MG: 4 INJECTION, SOLUTION INTRA-ARTICULAR; INTRALESIONAL; INTRAMUSCULAR; INTRAVENOUS; SOFT TISSUE at 19:42

## 2021-09-23 RX ADMIN — FENTANYL CITRATE 50 MCG: 50 INJECTION, SOLUTION INTRAMUSCULAR; INTRAVENOUS at 20:49

## 2021-09-23 ASSESSMENT — COPD QUESTIONNAIRES: COPD: 0

## 2021-09-23 NOTE — ANESTHESIA PREPROCEDURE EVALUATION
Anesthesia Pre-Procedure Evaluation    Patient: Zander Blunt   MRN: [id number] : 1985        Preoperative Diagnosis: Infection of bone, lower leg (H) [M86.9]   Procedure : Procedure(s):  IRRIGATION AND DEBRIDEMENT, RIGHT FIRST METATARSAL PHALANGEAL JOINT     No past medical history on file.   No past surgical history on file.   Allergies   Allergen Reactions     Gluten Meal Other (See Comments) and Cramps     Asthma       Social History     Tobacco Use     Smoking status: Former Smoker     Quit date: 2006     Years since quitting: 15.5     Smokeless tobacco: Never Used   Substance Use Topics     Alcohol use: No      Wt Readings from Last 1 Encounters:   21 97.5 kg (215 lb)        Anesthesia Evaluation            ROS/MED HX  ENT/Pulmonary:    (-) COPD and sleep apnea   Neurologic:       Cardiovascular:     (+) hypertension-----    METS/Exercise Tolerance:     Hematologic:       Musculoskeletal:       GI/Hepatic:    (-) GERD   Renal/Genitourinary:     (+) renal disease (in past), type: ARF,     Endo:       Psychiatric/Substance Use: Comment: Panic disorder  Substance disorder  adhd    (+) psychiatric history anxiety and depression     Infectious Disease:       Malignancy:       Other:            Physical Exam    Airway        Mallampati: I   TM distance: > 3 FB   Neck ROM: full     Respiratory Devices and Support         Dental  no notable dental history         Cardiovascular   cardiovascular exam normal          Pulmonary           breath sounds clear to auscultation           OUTSIDE LABS:  CBC:   Lab Results   Component Value Date    WBC 4.0 2014    WBC 4.3 2014    HGB 12.0 (L) 2014    HGB 10.3 (L) 2014    HCT 37.8 (L) 2014    HCT 32.7 (L) 2014     2014     2014     BMP:   Lab Results   Component Value Date     2014    POTASSIUM 4.4 2014    CHLORIDE 103 2014    CO2 26 2014    BUN 10 2014    CR  0.84 02/24/2014    GLC 82 02/24/2014     COAGS: No results found for: PTT, INR, FIBR  POC: No results found for: BGM, HCG, HCGS  HEPATIC:   Lab Results   Component Value Date    ALBUMIN 4.1 02/24/2014    PROTTOTAL 7.0 02/24/2014    ALT 54 02/24/2014    AST 37 02/24/2014    ALKPHOS 70 02/24/2014    BILITOTAL 0.2 02/24/2014     OTHER:   Lab Results   Component Value Date    CAIT 9.4 02/24/2014       Anesthesia Plan    ASA Status:  2, emergent       Anesthesia Type: General.     - Airway: ETT              Consents    Anesthesia Plan(s) and associated risks, benefits, and realistic alternatives discussed. Questions answered and patient/representative(s) expressed understanding.     - Discussed with:  Patient         Postoperative Care       PONV prophylaxis: Ondansetron (or other 5HT-3)     Comments:                Ebony Stout

## 2021-09-23 NOTE — CONSULTS
[facility]    Orthopedics Consultation    Date of Admission:  9/23/2021    Assessment & Plan   Zander Blunt is a 35 year old male who was admitted on 9/23/2021. I was asked to see the patient for right foot pain. He has a septic right first metatarsophalangeal joint.     Zander was previously seen by my partner Dr. Peterson in clinic and he was aspirated yesterday. He initially thought this was a gout flare and injected cortisone after the aspiration. He rightfully sent the fluid culture and it showed a cell count of 389,000 white blood cells. It also demonstrated staph aureus. Once Dr. Peterson learned of these results he called me as I was on trauma call. We brought the patient to the hospital and he will be admitted to the hospitalist service. He was brought immediately down to the emergency department where a rapid Covid test was performed. He was then transported to the preoperative area where I examined him. He certainly examines like a septic right first MTP joint. I explained to the patient that this is a surgical emergency and must be dealt with as soon as possible. We will perform an irrigation and debridement of the right first MTP joint. He understands this. He will be admitted to the hospital and will be placed on IV antibiotics. He will also be treated by the hospitalist and infectious disease teams. I am certainly concerned with his history of IV drug abuse and this must be addressed.    Preoperative orders have been placed. We will hold off on antibiotics until we get intraoperative cultures. We also ordered blood cultures and baseline labs, including ESR and CRP. He is n.p.o.      Khalif Oconnell MD    Reason for Consult   Reason for consult: Right foot pain    Primary Care Physician   Provider Not In System    History of Present Illness   Zander Blunt is a 35 year old male who presents with a chief complaint of right forefoot pain. The patient was seen yesterday by one  of my partners in clinic. His right first MTP joint was aspirated and was subsequently injected with cortisone. My partner was under the presumption that this was gout. Likely send cultures and fluid sample for evaluation. This returned with 389,000 white blood cells and staph aureus. My partner called me and stated that this needed to be washed out as soon as possible. I talked with Zander today and he complains of substantial pain to the right first MTP joint. Any movement to that joint causes substantial pain. He denies any fevers or chills. He denies any malaise feeling. Prior to arrival I was informed by my partner that the patient has a history of IV drug abuse.     MEDS:   No current outpatient medications on file.       PAST MEDICAL HISTORY: No past medical history on file.    PAST SURGICAL HISTORY: No past surgical history on file.    FAMILY HISTORY: No family history on file.    SOCIAL HISTORY:   Social History     Tobacco Use     Smoking status: Former Smoker     Quit date: 2/24/2006     Years since quitting: 15.5     Smokeless tobacco: Never Used   Substance Use Topics     Alcohol use: No       ALLERGIES:    Allergies   Allergen Reactions     Gluten Meal Other (See Comments) and Cramps     Asthma        ROS:  10 point ROS neg other than the symptoms noted above in the HPI.    Physical Exam   Temp: 96.9  F (36.1  C)   BP: 116/68     Resp: 16 SpO2: 98 % O2 Device: None (Room air)    Vital Signs with Ranges  Temp:  [96.9  F (36.1  C)] 96.9  F (36.1  C)  Resp:  [16] 16  BP: (116)/(68) 116/68  SpO2:  [98 %] 98 %  0 lbs 0 oz    Constitutional: Pleasant, alert, appropriate, following commands.  HEENT: Head atraumatic normocephalic. Pupils equal round and reactive to light.  Respiratory: Unlabored breathing no audible wheeze  Cardiovascular: Regular rate and rhythm  GI: Abdomen soft nontender nondistended.  Lymph/Hematologic: No lymphadenopathy in areas examined  Genitourinary:  No mcleod  Skin: No rashes, no  cyanosis, no edema.  Musculoskeletal: Focused examination of the right lower extremity demonstrates tenderness to palpation throughout the first MTP joint. He has overlying erythema that expands from the first MTP joint laterally over the second and third MTP joints. With passive movement of the first MTP joint there is substantial pain. He has no pain with movement of the second through fifth MTP joints. He is tender to palpation in the first webspace. He has normal sensation distally in the foot. He can flex and extend the great toe. This causes pain however. He has a 2+ DP and PT pulses. He has good capillary refill less than 2 seconds. He has well-healed surgical incision throughout his ankle.  Neurologic: normal without focal findings, mental status, speech normal, alert and oriented x iii, ESTEFANIA, reflexes normal and symmetric    I did review his right foot x-rays from clinic. This demonstrates no fractures or dislocations. He does have medial and lateral malleoli hardware from a previous ankle fracture. These appear well-healed based on these views. His first MTP joint does not look arthritic and he has no deformity of his forefoot.      Data   Results for orders placed or performed during the hospital encounter of 09/23/21 (from the past 24 hour(s))   Asymptomatic COVID-19 Virus (Coronavirus) by PCR Nasopharyngeal    Specimen: Nasopharyngeal; Swab   Result Value Ref Range    SARS CoV2 PCR Negative Negative    Narrative    Testing was performed using the nadira  SARS-CoV-2 & Influenza A/B Assay on the nadira  [employer]  System.  This test should be ordered for the detection of SARS-COV-2 in individuals who meet SARS-CoV-2 clinical and/or epidemiological criteria. Test performance is unknown in asymptomatic patients.  This test is for in vitro diagnostic use under the FDA EUA for laboratories certified under CLIA to perform moderate and/or high complexity testing. This test has not been FDA cleared or approved.  A  negative test does not rule out the presence of PCR inhibitors in the specimen or target RNA in concentration below the limit of detection for the assay. The possibility of a false negative should be considered if the patient's recent exposure or clinical presentation suggests COVID-19.  [facility] are certified under the Clinical Laboratory Improvement Amendments of 1988 (CLIA-88) as qualified to perform moderate and/or high complexity laboratory testing.

## 2021-09-24 ENCOUNTER — HOME INFUSION (PRE-WILLOW HOME INFUSION) (OUTPATIENT)
Dept: PHARMACY | Facility: CLINIC | Age: 36
End: 2021-09-24

## 2021-09-24 ENCOUNTER — APPOINTMENT (OUTPATIENT)
Dept: CARDIOLOGY | Facility: CLINIC | Age: 36
End: 2021-09-24
Attending: HOSPITALIST
Payer: COMMERCIAL

## 2021-09-24 LAB
ANION GAP SERPL CALCULATED.3IONS-SCNC: 5 MMOL/L (ref 3–14)
BUN SERPL-MCNC: 11 MG/DL (ref 7–30)
CALCIUM SERPL-MCNC: 8.5 MG/DL (ref 8.5–10.1)
CHLORIDE BLD-SCNC: 107 MMOL/L (ref 94–109)
CO2 SERPL-SCNC: 28 MMOL/L (ref 20–32)
CREAT SERPL-MCNC: 0.74 MG/DL (ref 0.66–1.25)
ERYTHROCYTE [DISTWIDTH] IN BLOOD BY AUTOMATED COUNT: 13.2 % (ref 10–15)
GFR SERPL CREATININE-BSD FRML MDRD: >90 ML/MIN/1.73M2
GLUCOSE BLD-MCNC: 122 MG/DL (ref 70–99)
GLUCOSE BLDC GLUCOMTR-MCNC: 116 MG/DL (ref 70–99)
HBV SURFACE AB SERPL IA-ACNC: 890.47 M[IU]/ML
HBV SURFACE AG SERPL QL IA: NONREACTIVE
HCT VFR BLD AUTO: 37.4 % (ref 40–53)
HCV AB SERPL QL IA: NONREACTIVE
HGB BLD-MCNC: 12.2 G/DL (ref 13.3–17.7)
HIV 1+2 AB+HIV1 P24 AG SERPL QL IA: NONREACTIVE
LVEF ECHO: NORMAL
MCH RBC QN AUTO: 27.8 PG (ref 26.5–33)
MCHC RBC AUTO-ENTMCNC: 32.6 G/DL (ref 31.5–36.5)
MCV RBC AUTO: 85 FL (ref 78–100)
PLATELET # BLD AUTO: 278 10E3/UL (ref 150–450)
POTASSIUM BLD-SCNC: 4.2 MMOL/L (ref 3.4–5.3)
RBC # BLD AUTO: 4.39 10E6/UL (ref 4.4–5.9)
SODIUM SERPL-SCNC: 140 MMOL/L (ref 133–144)
WBC # BLD AUTO: 5.7 10E3/UL (ref 4–11)

## 2021-09-24 PROCEDURE — 250N000011 HC RX IP 250 OP 636: Performed by: ORTHOPAEDIC SURGERY

## 2021-09-24 PROCEDURE — 250N000013 HC RX MED GY IP 250 OP 250 PS 637: Performed by: INTERNAL MEDICINE

## 2021-09-24 PROCEDURE — 87340 HEPATITIS B SURFACE AG IA: CPT | Performed by: SPECIALIST

## 2021-09-24 PROCEDURE — 80048 BASIC METABOLIC PNL TOTAL CA: CPT | Performed by: INTERNAL MEDICINE

## 2021-09-24 PROCEDURE — 99232 SBSQ HOSP IP/OBS MODERATE 35: CPT | Performed by: HOSPITALIST

## 2021-09-24 PROCEDURE — 93306 TTE W/DOPPLER COMPLETE: CPT

## 2021-09-24 PROCEDURE — 86706 HEP B SURFACE ANTIBODY: CPT | Performed by: SPECIALIST

## 2021-09-24 PROCEDURE — 87389 HIV-1 AG W/HIV-1&-2 AB AG IA: CPT | Performed by: SPECIALIST

## 2021-09-24 PROCEDURE — 120N000001 HC R&B MED SURG/OB

## 2021-09-24 PROCEDURE — 87040 BLOOD CULTURE FOR BACTERIA: CPT | Performed by: INTERNAL MEDICINE

## 2021-09-24 PROCEDURE — 250N000011 HC RX IP 250 OP 636: Performed by: SPECIALIST

## 2021-09-24 PROCEDURE — 36415 COLL VENOUS BLD VENIPUNCTURE: CPT | Performed by: INTERNAL MEDICINE

## 2021-09-24 PROCEDURE — 36415 COLL VENOUS BLD VENIPUNCTURE: CPT | Performed by: SPECIALIST

## 2021-09-24 PROCEDURE — 93306 TTE W/DOPPLER COMPLETE: CPT | Mod: 26 | Performed by: INTERNAL MEDICINE

## 2021-09-24 PROCEDURE — 86803 HEPATITIS C AB TEST: CPT | Performed by: SPECIALIST

## 2021-09-24 PROCEDURE — 85027 COMPLETE CBC AUTOMATED: CPT | Performed by: INTERNAL MEDICINE

## 2021-09-24 PROCEDURE — 999N000216 HC STATISTIC ADULT CD FACE TO FACE-NO CHRG

## 2021-09-24 PROCEDURE — 99222 1ST HOSP IP/OBS MODERATE 55: CPT | Mod: AI | Performed by: INTERNAL MEDICINE

## 2021-09-24 RX ORDER — CEFAZOLIN SODIUM 2 G/100ML
2 INJECTION, SOLUTION INTRAVENOUS EVERY 8 HOURS
Status: DISCONTINUED | OUTPATIENT
Start: 2021-09-24 | End: 2021-09-25

## 2021-09-24 RX ORDER — NALOXONE HYDROCHLORIDE 0.4 MG/ML
0.4 INJECTION, SOLUTION INTRAMUSCULAR; INTRAVENOUS; SUBCUTANEOUS
Status: DISCONTINUED | OUTPATIENT
Start: 2021-09-24 | End: 2021-10-10 | Stop reason: HOSPADM

## 2021-09-24 RX ORDER — NALOXONE HYDROCHLORIDE 0.4 MG/ML
0.2 INJECTION, SOLUTION INTRAMUSCULAR; INTRAVENOUS; SUBCUTANEOUS
Status: DISCONTINUED | OUTPATIENT
Start: 2021-09-24 | End: 2021-10-10 | Stop reason: HOSPADM

## 2021-09-24 RX ADMIN — CEFAZOLIN SODIUM 2 G: 2 INJECTION, SOLUTION INTRAVENOUS at 11:50

## 2021-09-24 RX ADMIN — CEFAZOLIN SODIUM 2 G: 2 INJECTION, SOLUTION INTRAVENOUS at 20:36

## 2021-09-24 RX ADMIN — CEFAZOLIN SODIUM 2 G: 2 INJECTION, SOLUTION INTRAVENOUS at 05:29

## 2021-09-24 ASSESSMENT — ACTIVITIES OF DAILY LIVING (ADL)
ADLS_ACUITY_SCORE: 6
ADLS_ACUITY_SCORE: 6
ADLS_ACUITY_SCORE: 5
ADLS_ACUITY_SCORE: 6

## 2021-09-24 NOTE — PROGRESS NOTES
[facility]    Received referral for IV abx. Pt has full coverage at 100% for IV abx through their [facility] plan. There may be a copay upon dispense.      Due to Hx of IVDU, pt would need to have assessment by [facility] RN prior to acceptance for home infusion. I spoke with pt's RNCC and she requested I wait to meet with this patient until he is closer to discharge. I will follow-up on Monday.    Thank you     Michelle Matson RN  [facility] Liaison  [phone] (Mon thru Fri 8am - 5pm)  [phone] Office

## 2021-09-24 NOTE — ANESTHESIA POSTPROCEDURE EVALUATION
Patient: Zander Blunt    Procedure(s):  IRRIGATION AND DEBRIDEMENT, RIGHT FIRST METATARSAL PHALANGEAL JOINT    Diagnosis:Infection of bone, lower leg (H) [M86.9]  Diagnosis Additional Information: No value filed.    Anesthesia Type:  General    Note:  Disposition: Outpatient   Postop Pain Control: Uneventful            Sign Out: Well controlled pain   PONV: No   Neuro/Psych: Uneventful            Sign Out: Acceptable/Baseline neuro status   Airway/Respiratory: Uneventful            Sign Out: Acceptable/Baseline resp. status   CV/Hemodynamics: Uneventful            Sign Out: Acceptable CV status   Other NRE:    DID A NON-ROUTINE EVENT OCCUR? No           Last vitals:  Vitals Value Taken Time   /79 09/23/21 2040   Temp 36.6  C (97.8  F) 09/23/21 2011   Pulse 61 09/23/21 2047   Resp 12 09/23/21 2047   SpO2 98 % 09/23/21 2040   Vitals shown include unvalidated device data.    Electronically Signed By: Ebony Stout  September 23, 2021  8:48 PM

## 2021-09-24 NOTE — ANESTHESIA PROCEDURE NOTES
Airway       Patient location during procedure: OR ([facility] - Operating Room or Procedural Area)       Procedure Start/Stop Times: 9/23/2021 7:38 PM  Staff -        Anesthesiologist:  Ebony Stout       CRNA: Татьяна Salomon APRN CRNA       Performed By: CRNA  Consent for Airway        Urgency: elective  Indications and Patient Condition       Indications for airway management: jt-procedural       Induction type:intravenous       Mask difficulty assessment: 0 - not attempted    Final Airway Details       Final airway type: endotracheal airway       Successful airway: ETT - single  Endotracheal Airway Details        ETT size (mm): 8.0       Successful intubation technique: direct laryngoscopy       DL Blade Type: Wolf 2       Grade View of Cords: 1       Adjucts: stylet       Position: Right       Measured from: gums/teeth       Secured at (cm): 23       Bite block used: None    Post intubation assessment        Number of attempts at approach: 1       Number of other approaches attempted: 0       Secured with: pink tape       Ease of procedure: easy       Dentition: Intact and Unchanged

## 2021-09-24 NOTE — PLAN OF CARE
Summary:     Date/Time 9/24 1500-1930    Service: Ortho  Behavior/Aggression tool color: Yellow  Diagnosis: ID L foot  POD#: 1  Mental Status: a, Ox4  Activity/dangle: SBA, up independent, refuses bed alarm.  Diet: Reg  Pain: 3/10 at rest, declined tylenol   Coleman/Voiding: Voiding WDL in BR  Tele/Restraints/Iso: n/a  02/LDA: PIV SL.   D/C Date: pending antibiotic plan.

## 2021-09-24 NOTE — PLAN OF CARE
A&Ox4. VSS. CMS intact, ace bandage to RLE. Reporting minimal pain, declines sched Tylenol. Tolerating PO. Up SBA- refuses bed alarm. SL between abx. Plan pending- continue to monitor.

## 2021-09-24 NOTE — CONSULTS
9/24/2021        Met with pt via [employer] for CD Consult. Pt reports he would not be interested in attending inpatient CD treatment at this time. Pt reports he attends sober support meetings and has a sponsor. Pt reports he would be interested in following up with outpatient CD treatment after he is discharged. Pt reports he does not check his email often. Email has been sent to unit  with resources so pt is able to call Mental Health and Addiction Services Line: [phone] and make an appt through [employer] for an evaluation after he is discharged.        KASHMIR Carlos   Phone: [phone]  Email: [email or web address]

## 2021-09-24 NOTE — PHARMACY-ADMISSION MEDICATION HISTORY
Pharmacy Medication History  Admission medication history interview status for the 9/23/2021  admission is complete. See EPIC admission navigator for prior to admission medications     Location of Interview: Phone  Medication history sources: Patient, Surescripts and Care Everywhere    Significant changes made to the medication list:  Added all meds - pt confirms he is not taking Wellbutrin XL 300mg daily at this time     In the past week, patient estimated taking medication this percent of the time: greater than 90%    Additional medication history information:       Medication reconciliation completed by provider prior to medication history? No    Time spent in this activity: 15 min    Prior to Admission medications    Medication Sig Last Dose Taking? Auth Provider   albuterol (PROAIR HFA/PROVENTIL HFA/VENTOLIN HFA) 108 (90 Base) MCG/ACT inhaler Inhale 1-2 puffs into the lungs every 4 hours as needed for shortness of breath / dyspnea or wheezing Past Month at Unknown time Yes Unknown, Entered By History   ALPRAZolam (XANAX) 1 MG tablet Take 1 mg by mouth 2 times daily as needed for anxiety 9/22/2021 at Unknown time Yes Unknown, Entered By History   doxycycline hyclate (VIBRAMYCIN) 100 MG capsule Take 100 mg by mouth 2 times daily Started 9/18 - 10 day course 9/23/2021 at Unknown time Yes Unknown, Entered By History   traZODone (DESYREL) 50 MG tablet Take  mg by mouth nightly as needed for sleep Past Month at Unknown time Yes Unknown, Entered By History       The information provided in this note is only as accurate as the sources available at the time of update(s)

## 2021-09-24 NOTE — H&P
Admitted: 09/23/2021    CHIEF COMPLAINT:  Right foot pain.    HISTORY OF PRESENT ILLNESS:  Zander Blunt is 35 with a history of ADHD, adjustment disorder, anxiety, history of right leg cellulitis, chemical dependency, depression, hypertension, panic disorder, who presents to [facility] hospitalist service after undergoing an emergent surgery for septic right first metatarsophalangeal joint infection.    The patient was seen in clinic on 09/22/2021, where he was noted to have right foot pain.  It was thought that the patient had gouty flare and had cortisone injected and aspirated.  The fluid was sent for microbiology and cytology, which showed cell count of 389,000 white cells.  This also demonstrated Staph aureus.  Once this was known, the patient was contacted, and he came to the Emergency Department, where a rapid COVID test was performed, which was negative, and the patient was taken to the OR for irrigation and debridement of the right first metatarsophalangeal joint with excisional debridement of subcutaneous tissue and synovium by Dr. Khalif Oconnell.  The patient is now postop and is stable hemodynamically, though slightly sedated.    PAST MEDICAL HISTORY:    1.  History of acute kidney injury.  2.  ADHD.  3.  Adjustment disorder.  4.  Anxiety.  5.  Right leg cellulitis.    6.  Chemical dependency.  7.  Depression.  8.  External hemorrhoids.  9.  Hypertension.  10.  Iron deficiency anemia.  11.  Panic disorder.  12.  Rhabdomyolysis.  13.  Substance abuse.    PAST SURGICAL HISTORY:  Flexible sigmoidoscopy, right foot glass removal, ORIF right ankle, ORIF right arm.    FAMILY HISTORY:  Positive for hypertension, stroke.    SOCIAL HISTORY:  History of substance abuse, no alcohol or tobacco.    ALLERGIES:  GLUTEN MEAL CAUSES CRAMPS AND ASTHMA.    CURRENT MEDICATION LIST:  1.  Albuterol.  2.  Alprazolam.  3.  Trazodone.  4.  Doxycycline.    REVIEW OF SYSTEMS:  Denies any cardiopulmonary  complaints or abdominal pain.  Positive for pain.  No fevers, chills, sweats.  Otherwise, 10 points reviewed.    PHYSICAL EXAMINATION:    VITAL SIGNS:  Temperature 98.1, heart rate 58, respirations 11, blood pressure 116/72, saturations 99% on room air.  GENERAL:  The patient is a nonseptic-appearing 35-year-old  gentleman.  He is sedated but arousable.  HEENT:  Unremarkable.  NECK:  No JVP elevation.    PULMONARY:  Clear to auscultation.  CARDIOVASCULAR:  S1, S2, regular rhythm.  No murmurs, gallops, or rubs.  GASTROINTESTINAL:  Abdomen is soft, nontender, normoactive bowel sounds.  MUSCULOSKELETAL:  Shows no edema.  The right foot is in a dressing, not examined.  NEUROLOGIC:  He is able to move all 4 extremities.  Cranial nerves appear to be grossly intact.  SKIN:  Warm, dry, well perfused.  PSYCHIATRIC:  He is sedated.  Affect is flat.    LABORATORY DATA:  As dictated in History of Present Illness.    ASSESSMENT:  Melly Christian is 35 with a prior history of substance abuse with a right first MTP joint septic arthritis who is status post incision and drainage, currently stable.    PLAN:    1.  Septic first metatarsophalangeal joint arthritis:  The patient had debridement.  He did receive cortisone shot yesterday.  The microbiology for this is Staphylococcus aureus.  The patient has been started on Ancef.  We will obtain ID consultation and an echocardiogram and serial blood cultures.  The patient will be treated with Tylenol for pain.  The patient will have a Chemical Dependency evaluation.  2.  Depression, anxiety:  The patient will be treated with Xanax and trazodone as per his home regimen.    DEEP VENOUS THROMBOSIS PROPHYLAXIS:  The patient received compression boots.    CODE STATUS:  Full.    Wale Thakur MD        D: 2021   T: 2021   MT: [facility]    Name:     MELLY CHRISTIAN  MRN:      [id number]        Account:     [id number]   :      1985           Admitted:    2021        Document: [id number]

## 2021-09-24 NOTE — PLAN OF CARE
Service: Medical  Behavior/Aggression tool color: Yellow  Diagnosis: ID L foot  POD#: 1  Mental Status: alert, Ox4, anxious appearing, flat affect. Agitated at times.  Activity/dangle: SBA  Diet: Regular, good appetite. Denies nausea.  Pain: 3/10 at rest, declined tylenol overnight.   Coleman/Voiding: Voiding WDL in BR  Tele/Restraints/Iso: n/a  02/LDA: PIV SL.   D/C Date: pending antibiotic plan.  Other Info: Foot dressing ACE wrapped CDI. CMS intact. Echocardiogram ordered. Blood cultures pending.

## 2021-09-24 NOTE — CONSULTS
"BRIEF NUTRITION ASSESSMENT      REASON FOR ASSESSMENT:  Zander Blunt is a 35 year old male seen by Registered Dietitian for Admission Nutrition Risk Screen for Have you recently lost weight without trying? - Unsure   Have you eaten poorly because of a decreased appetite? - No    NUTRITION HISTORY:  Patient states that he has been eating well at home but he is unsure if he has lost weight or not.  \"If I have I'm not worried about it\".  He notes that he usually weighs \"a patel eighty five\".    CURRENT DIET AND INTAKE:  Diet:  Regular               Patient notes that he is eating well and was able to consume all of his lunch --> Mixed green salad with chicken, pot roast, green beans, mashed potatoes, and cranberry juice.     ANTHROPOMETRICS:  Height: 6'0\"  Weight:  87.9 kg (194#)(9/24)  Body mass index is 26.28 kg/m    Weight Status: Overweight BMI 25-29.9  IBW:  80.9 kg   %IBW: 109%  Weight History:   Wt Readings from Last 10 Encounters:   09/24/21 87.9 kg (193 lb 12.6 oz)   07/09/21 97.5 kg (215 lb)   03/25/18 87.5 kg (193 lb)   06/21/16 87.8 kg (193 lb 8 oz)   03/25/15 93 kg (205 lb)   07/03/14 83.9 kg (185 lb)   02/24/14 90.7 kg (200 lb)     Patient notes he is usually 185# so no weight loss noted (appears all weights above are reported except for today's)    LABS:  Labs noted    MALNUTRITION:  Visual Nutrition Focused Physical Assessment (NFPA) completed - no muscle/fat losses noted.  Patient does not meet two of the following criteria necessary for diagnosing malnutrition.     % Weight Loss:  None noted  % Intake:  No decreased intake noted  Subcutaneous Fat Loss:  None observed  Muscle Loss:  None observed  Fluid Retention:  None noted    NUTRITION INTERVENTION:  Nutrition Diagnosis:  No nutrition diagnosis at this time.    Implementation:  Nutrition Education:  Per Provider order if indicated.    FOLLOW UP/MONITORING:   Will re-evaluate in 7 - 10 days, or sooner, if re-consulted.    Genesis Whitman RD, LD, " CNSC   Clinical Dietitian - [facility]

## 2021-09-24 NOTE — PROGRESS NOTES
[facility]    Hospitalist Progress Note      Assessment & Plan   Zander Blunt is a 35 year old male who was admitted on 9/23/2021 after undergoing an emergent surgery for septic right first metatarsophalangeal joint infection with orthopedic surgery    Staph aureus septic first metatarsophalangeal joint arthritis s/p I&D 9/23  *Preceding the foot infection, Zander had an infection in his arm (a week prior) related to IVDA. He was given doxycycline which he took for a week prior to presentation. Then the toe/foot issue popped up. He does not inject into his foot  Ortho first thought that foot pain was gout flare and had cortisone injected after aspiration, fluid cx showed 389k WBC and staph aureus, then recommended to come to hospital for intervention. I&D of right first MTP on 9/23 with ortho.  *Echo: EF 60-65%, mild concentric LVH, RV normal. LV mildly dilated, no valvular vegetations seen. IVC normal in size.  - ID and ortho appreciated  - Follow up OR culture sensitivities and blood cultures  - continue ancef  - HIV/hep B/C serologies pending  - Plan for IV abx for several weeks, history of substance abuse complicates this   - WBAT with post-op shoe  - dressings per Ortho  - scheduled tylenol, PRN low dose oxycodone (wean off in hospital and no plan to prescribe on discharge)  - scheduled bowel regimen in place    Depression  Anxiety  Continue PTA xanax and trazodone    Anemia, likely chronic  Hgb 12.3 on admit, came down to 12.2 post procedure. L    Hx Substance abuse  Treated in 2006/2007 per records. Hx IV drug use. Will follow up after discharge for outpatient treatment.    Clinically Significant Risk Factors Present on Admission                     DVT Prophylaxis: Pneumatic Compression Devices  Code Status: Full Code  Expected discharge: 09/26/2021 or later recommended to prior living arrangement vs TCU pending Abx plan     Janett Gerber, DO  Hospitalist Service  [facility]  [facility]  Securely message with the [employer] Web Console (learn more here)  Text Page (7am - 6pm) via [employer] Paging/Directory      Interval History   Patient seen and examined. He is doing well. Had a late night with operative intervention and meeting hospitalist early this morning. He reports receiving tylenol in the middle of the night when he wasn't having much pain. He reported a twinge of pain in his big toe today, but that's all. He had been resting comfortably. No chest pain, shortness of breath, abdominal pain, nausea, vomiting. Tolerated diet.    -Data reviewed today: I reviewed all new labs and imaging results over the last 24 hours. I personally reviewed no images or EKG's today.    Physical Exam   Temp: 97.9  F (36.6  C) Temp src: Oral BP: 123/63 Pulse: 62   Resp: 16 SpO2: 96 % O2 Device: None (Room air) Oxygen Delivery: 7 LPM  Vitals:    09/24/21 0642   Weight: 87.9 kg (193 lb 12.6 oz)     Vital Signs with Ranges  Temp:  [96.9  F (36.1  C)-97.9  F (36.6  C)] 97.9  F (36.6  C)  Pulse:  [58-70] 62  Resp:  [9-17] 16  BP: (116-141)/(63-95) 123/63  SpO2:  [95 %-100 %] 96 %  I/O last 3 completed shifts:  In: 700 [I.V.:700]  Out: -     Constitutional: Awake, alert, cooperative, no apparent distress  Respiratory: Clear to auscultation bilaterally, no crackles or wheezing  Cardiovascular: Regular rate and rhythm, normal S1 and S2, and no murmur noted  GI: Normal bowel sounds, soft, non-distended, non-tender  Skin/Integumen: No rashes, no cyanosis, trace edema RLE, none on left.  Other: Right foot dressed, sensation intact to distal toes, toes warm/pink    Medications       acetaminophen  975 mg Oral Q8H     polyethylene glycol  17 g Oral Daily     senna-docusate  1 tablet Oral BID     sodium chloride (PF)  3 mL Intracatheter Q8H       Data   Recent Labs   Lab 09/24/21  0952 09/24/21 0928 09/23/21  1926   WBC  --  5.7 5.8   HGB  --  12.2* 12.3*   MCV  --  85 84   PLT  --  278 252   NA  --  140  137   POTASSIUM  --  4.2 4.1   CHLORIDE  --  107 105   CO2  --  28 30   BUN  --  11 11   CR  --  0.74 0.78   ANIONGAP  --  5 2*   CAIT  --  8.5 9.1   * 122* 115*       Recent Results (from the past 24 hour(s))   Echocardiogram Complete   Result Value    LVEF  60-65%    Narrative    [id number]  [id number]  [id number]  [id number]^JAIME^ANCELMO^ORI     [facility]  Echocardiography Laboratory  [address] [place] [id number]     Name: MELLY CHRISTIAN  MRN: [id number]  : 1985  Study Date: 2021 08:16 AM  Age: 35 yrs  Gender: Male  Patient Location: [facility]  Reason For Study: Endocarditis  Ordering Physician: ANCELMO SANDOVAL  Performed By: Migel Leung     BSA: 2.1 m2  Height: 72 in  Weight: 193 lb  HR: 67  BP: 116/72 mmHg  ______________________________________________________________________________  Procedure  Complete Portable Echo Adult.  ______________________________________________________________________________  Interpretation Summary     The visual ejection fraction is 60-65%. There is mild concentric left  ventricular hypertrophy.  The right ventricle is normal in size and function.  The left atrium is mildly dilated.  No significant valve disease. No obvious evidence of valvular vegetations on  these TTE images.  The inferior vena cava was normal in size with preserved respiratory  variability.  There is no pericardial effusion.  ______________________________________________________________________________  Left Ventricle  The left ventricle is normal in size. There is mild concentric left  ventricular hypertrophy. Left ventricular systolic function is normal. The  visual ejection fraction is 60-65%. Diastolic Doppler findings (E/E' ratio  and/or other parameters) suggest left ventricular filling pressures are  normal. No regional wall motion abnormalities noted.     Right Ventricle  The right ventricle is normal in size and function.     Atria  The left  atrium is mildly dilated. Right atrial size is normal.     Mitral Valve  There is mild mitral annular calcification. The mitral valve is normal in  structure and function. There is trace mitral regurgitation. There is no  mitral valve stenosis.     Tricuspid Valve  The tricuspid valve is normal in structure and function. The right ventricular  systolic pressure is approximated at 17.9 mmHg plus the right atrial pressure.     Aortic Valve  The aortic valve is trileaflet with aortic valve sclerosis. No aortic  regurgitation is present. No aortic stenosis is present.     Pulmonic Valve  The pulmonic valve is not well seen, but is grossly normal.     Vessels  The aortic root is normal size. The inferior vena cava was normal in size with  preserved respiratory variability.     Pericardium  There is no pericardial effusion.     ______________________________________________________________________________  MMode/2D Measurements & Calculations  IVSd: 1.2 cm  LVIDd: 4.8 cm  LVIDs: 2.9 cm  LVPWd: 1.3 cm  FS: 40.4 %     LV mass(C)d: 231.5 grams  LV mass(C)dI: 110.3 grams/m2  Ao root diam: 3.0 cm  LA dimension: 3.8 cm  asc Aorta Diam: 2.7 cm  LA/Ao: 1.3  LVOT diam: 2.2 cm  LVOT area: 3.9 cm2  LA Volume (BP): 75.8 ml  LA Volume Index (BP): 36.1 ml/m2  RWT: 0.52     Doppler Measurements & Calculations  MV E max geraldo: 83.4 cm/sec  MV A max geraldo: 50.9 cm/sec  MV E/A: 1.6     MV dec slope: 383.4 cm/sec2  PA acc time: 0.11 sec  TR max geraldo: 211.7 cm/sec  TR max P.9 mmHg  E/E' av.4  Lateral E/e': 6.7  Medial E/e': 8.0     ______________________________________________________________________________  Report approved by: Palak Caceres 2021 10:46 AM

## 2021-09-24 NOTE — OP NOTE
[facility]   Operative Note    Pre-operative diagnosis: Right septic first metatarsophalangeal joint   Post-operative diagnosis Same   Procedure: Procedure(s):  IRRIGATION AND DEBRIDEMENT, RIGHT FIRST METATARSAL PHALANGEAL JOINT, excisional debridement of subcutaneous tissue and synovium.   Surgeon(s): Surgeon(s) and Role:     * Khalif Oconnell MD - Primary   Estimated blood loss: 5 mL   Specimens: ID Type Source Tests Collected by Time Destination   A : Right first MTP joint culture Wound Foot, Right ANAEROBIC BACTERIAL CULTURE ROUTINE, GRAM STAIN, AEROBIC BACTERIAL CULTURE ROUTINE Khalif Oconnell MD 9/23/2021  8:00 PM       Findings: Purulence to the right first MTP joint     Indications: This is a 35-year-old male who was seen by one of my partners. He had a chief complaint of right first MTP joint pain. He had a substantially swollen joint and it was aspirated in the clinic. He sent this for culture and cell count. He also placed cortisone within the joint. He assumed this was a gouty flare as the patient was a 35-year-old healthy male. The aspiration results came back with greater than 300,000 white blood cells. The aspirate was also positive for Staph aureus. My partner called me and asked if I would assist with this patient. I examined the patient and agreed that this appeared to be a septic joint. We brought the patient immediately to the operating room to perform an irrigation and debridement of the right first metatarsophalangeal joint. I had a long discussion with the patient prior to surgery about risks benefits and alternatives of surgery. With a septic joint this is a surgical emergency. We discussed significant risks. His most significant risks include continued infection, need for further surgery, neurovascular injury, post septic arthritis, sepsis, wound healing problems, and anesthesia related complications. After discussing these risks he elected to undergo the  above-mentioned procedure.    Of note the patient does have a history of IV drug abuse.     Description of procedure:   The patient was met in the preoperative area and operative site which was the right foot was signed by myself. Preoperative antibiotics were held. He was brought back to the operating room and was placed in the supine position on the operating room table. All bony prominences were padded at this time. He underwent general anesthesia. He was then prepped and draped in normal sterile fashion. A timeout was then called ensuring we are operating on the correct site and the correct patient. All staff concerns were addressed at this time. Following the timeout the right lower extremity was elevated to gravity and a calf tourniquet was placed to 250 mmHg. An incision was made over the dorsal aspect of the first MTP joint dissection was carried down to the EHL tendon. This tendon was then mobilized medially. We then made a vertical incision through the dorsal first MTP joint capsule. We then entered the joint and significant purulent material was expressed. We took cultures at this time. We then gave Ancef. We then ran 3 L of normal saline through the joint. We also debrided the joint. This was some subcutaneous tissue and joint capsule. This was an excisional debridement of devitalized tissue. We then closed the dorsal capsule using 3-0 Monocryl suture. We then placed some 3-0 Monocryl's in the subcutaneous tissue and closed the skin using 3-0 nylon suture. Hemostasis was achieved prior to closure. We then placed local anesthetic around the wound.. We then placed sterile bandages on the patient and transferred him off of the operating table and brought him back to postanesthesia care unit where he awoke without any difficulty.    All counts were correct at the end of the case.    Postoperative plan: The patient will be weightbearing as tolerated on the right lower extremity. He will be placed on IV  antibiotics and our infectious disease team will be consulted. He will likely need an echocardiogram to look for vegetations as he does have a history of IV drug abuse. Because he is weightbearing as tolerated he does not need DVT prophylaxis from an orthopedic standpoint.

## 2021-09-24 NOTE — PROGRESS NOTES
Orthopedic Surgery  Zander Blunt  2021  Admit Date:  2021  POD 1 Day Post-Op  S/P Procedure(s):  IRRIGATION AND DEBRIDEMENT, RIGHT FIRST METATARSAL PHALANGEAL JOINT    Patient resting comfortably in bed.    Pain controlled.  Tolerating oral intake.    Denies nausea or vomiting  Denies chest pain or shortness of breath  No events overnight.      Alert and orient to person, place, and time.  Vital Sign Ranges  Temperature Temp  Av.5  F (36.4  C)  Min: 96.9  F (36.1  C)  Max: 97.9  F (36.6  C)   Blood pressure Systolic (24hrs), Av , Min:116 , Max:141        Diastolic (24hrs), Av, Min:63, Max:95      Pulse Pulse  Av.3  Min: 58  Max: 70   Respirations Resp  Av.9  Min: 9  Max: 17   Pulse oximetry SpO2  Av.7 %  Min: 95 %  Max: 100 %       Dressing is clean, dry, and intact.   Minimal erythema of the surrounding skin.   Bilateral calves are soft, non-tender.  Bilateral lower extremity is NVI.  Sensation intact bilateral lower extremities  5/5 motor with resisted dorsi and plantar flexion bilaterally  +Dp pulse    Labs:  Recent Labs   Lab Test 21  1810   POTASSIUM 4.2 4.1 4.4     Recent Labs   Lab Test 21  1038   HGB 12.2* 12.3* 12.0*     No results for input(s): INR in the last 07596 hours.  Recent Labs   Lab Test 2128 21  1038    252 164       A/P  1. S/p 1st toe MTP I&D (septic native joint)   Continue amb and SCDs for DVT prophylaxis.     Mobilize with PT/OT    WBAT with post-op shoe   Leave dressings intact   IV abx per ID.     Continue current pain regiment.    2. Disposition   Anticipate d/c to home based on abx plan.    Karissa Ruffin PA-C

## 2021-09-24 NOTE — CONSULTS
[facility]    Infectious Disease Consultation     Date of Admission:  9/23/2021  Date of Consult : 09/24/21    Assessment:  1. S.aureus septic arthritis r 1st MTP joint , likely related to hematogenous seeding -does have substance abuse history. Blood cxs negative thus far. No clinical symptoms to suggest active bacteremia or endocarditis at this time  2. Chronic anemia  3. Chronic medical conditions - ADHD, history of right leg cellulitis, chemical dependency, depression, hypertension, panic disorder    Recommendations:  1. Await blood cxs.   2. Follow synovial fluid and OR cx sensitivities  3. Maintain on Cefazolin.   4. Check HIV/Hepatitis B/C serologies in view of substance abuse (added to specimen in lab)  5. PICC placement will be challenging given substance abuse history. Will need IV antibiotics for several weeks.  5. Antibiotics may need to be modified based upon culture data.      Danay Campbell MD    Reason for Consult   I was asked to evaluate this patient for antimicrobial recommendations for septic arthritis of the right foot.    Primary Care Physician   Physician No Ref-Primary    Chief Complaint   R foot septic arthritis    History is obtained from the patient and medical records    History of Present Illness   Zander Blunt is a 35 year old male with ADHD, history of right leg cellulitis, chemical dependency, depression, hypertension, panic disorder who has been hospitalized with right foot septic arthritis.    Patient was seen by Dr. Peterson for right foot pain and felt to have gouty flare. He received a steroid injection, and subsequently synovial fluid analysis showed high cell count consistent with infection and culture grew S.aureus, sensitivities pending. Patient was taken to the OR by Dr. Oconnell for debridement yesterday 9/24. Operative cx are pending, gram stain shows GPC.    Patient has been initiated on Cefazolin and ID has been asked to assist with antibiotic  management. Patient has history of substance abuse    Antimicrobial therapy  9/23 Cefazolin  Past Medical History   I have reviewed this patient's medical history and updated it with pertinent information if needed.   No past medical history on file.    Past Surgical History   I have reviewed this patient's surgical history and updated it with pertinent information if needed.  No past surgical history on file.    Prior to Admission Medications   Prior to Admission Medications   Prescriptions Last Dose Informant Patient Reported? Taking?   ALPRAZolam (XANAX) 1 MG tablet 9/22/2021 at Unknown time Self Yes Yes   Sig: Take 1 mg by mouth 2 times daily as needed for anxiety   albuterol (PROAIR HFA/PROVENTIL HFA/VENTOLIN HFA) 108 (90 Base) MCG/ACT inhaler Past Month at Unknown time Self Yes Yes   Sig: Inhale 1-2 puffs into the lungs every 4 hours as needed for shortness of breath / dyspnea or wheezing   doxycycline hyclate (VIBRAMYCIN) 100 MG capsule 9/23/2021 at Unknown time Self Yes Yes   Sig: Take 100 mg by mouth 2 times daily Started 9/18 - 10 day course   traZODone (DESYREL) 50 MG tablet Past Month at Unknown time Self Yes Yes   Sig: Take  mg by mouth nightly as needed for sleep      Facility-Administered Medications: None     Allergies   Allergies   Allergen Reactions     Gluten Meal Other (See Comments) and Cramps     Asthma        Immunization History   Immunization History   Administered Date(s) Administered     Tdap (Adacel,Boostrix) 07/09/2021     Tetanus 02/23/2011       Social History   I have reviewed this patient's social history and updated it with pertinent information if needed. Zander Blunt  reports that he quit smoking about 15 years ago. He has never used smokeless tobacco. He reports that he does not drink alcohol and does not use drugs.    Family History   I have reviewed this patient's family history and updated it with pertinent information if needed.   No family history on file.    Review  of Systems   The 10 point Review of Systems is negative other than noted in the HPI or here.     Physical Exam   Temp: 97.8  F (36.6  C) Temp src: Temporal BP: 116/72 Pulse: 58   Resp: 11 SpO2: 99 % O2 Device: None (Room air) Oxygen Delivery: 7 LPM  Vital Signs with Ranges  Temp:  [96.9  F (36.1  C)-97.8  F (36.6  C)] 97.8  F (36.6  C)  Pulse:  [58-70] 58  Resp:  [9-17] 11  BP: (116-141)/(68-95) 116/72  SpO2:  [95 %-100 %] 99 %  193 lbs 12.55 oz  Body mass index is 26.28 kg/m .    GENERAL APPEARANCE:  awake  EYES: Eyes grossly normal to inspection, PERRL and conjunctivae and sclerae normal  NECK: no adenopathy, no asymmetry, masses, or scars and thyroid normal to palpation  RESP: lungs clear to auscultation - no rales, rhonchi or wheezes  CV: regular rates and rhythm, normal S1 S2, no S3 or S4 and no murmur, click or rub  ABDOMEN: soft, nontender, without hepatosplenomegaly or masses and bowel sounds normal  MS: R foot in surgical dressing  SKIN: no suspicious lesions or rashes      Data   All laboratory data reviewed  Component      Latest Ref Rng & Units 9/23/2021   CRP Inflammation      0.0 - 8.0 mg/L 104.0 (H)   Sed Rate      0 - 15 mm/hr 78 (H)     Component      Latest Ref Rng & Units 9/23/2021   WBC      4.0 - 11.0 10e3/uL 5.8   RBC Count      4.40 - 5.90 10e6/uL 4.43   Hemoglobin      13.3 - 17.7 g/dL 12.3 (L)   Hematocrit      40.0 - 53.0 % 37.4 (L)   MCV      78 - 100 fL 84   MCH      26.5 - 33.0 pg 27.8   MCHC      31.5 - 36.5 g/dL 32.9   RDW      10.0 - 15.0 % 13.0   Platelet Count      150 - 450 10e3/uL 252     Component      Latest Ref Rng & Units 9/23/2021   Sodium      133 - 144 mmol/L 137   Potassium      3.4 - 5.3 mmol/L 4.1   Chloride      94 - 109 mmol/L 105   Carbon Dioxide      20 - 32 mmol/L 30   Anion Gap      3 - 14 mmol/L 2 (L)   Urea Nitrogen      7 - 30 mg/dL 11   Creatinine      0.66 - 1.25 mg/dL 0.78   Calcium      8.5 - 10.1 mg/dL 9.1   Glucose      70 - 99 mg/dL 115 (H)   GFR  Estimate      >60 mL/min/1.73m2 >90     Micro  9/22 r foot synovial fluid  Culture Culture in progress       3+ Staphylococcus aureusAbnormal         9/23 R foot wound  Gram Stain Result  Abnormal   1+ Gram positive cocci   Gram stain review consistent with reported results.    4+ WBC seen        Blood cx 9/23, 9/24 pending

## 2021-09-24 NOTE — ANESTHESIA CARE TRANSFER NOTE
Patient: Zander Blunt    Procedure(s):  IRRIGATION AND DEBRIDEMENT, RIGHT FIRST METATARSAL PHALANGEAL JOINT    Diagnosis: Infection of bone, lower leg (H) [M86.9]  Diagnosis Additional Information: No value filed.    Anesthesia Type:   General     Note:    Oropharynx: oropharynx clear of all foreign objects and spontaneously breathing  Level of Consciousness: drowsy  Oxygen Supplementation: face mask  Level of Supplemental Oxygen (L/min / FiO2): 6  Independent Airway: airway patency satisfactory and stable  Dentition: dentition unchanged  Vital Signs Stable: post-procedure vital signs reviewed and stable  Report to RN Given: handoff report given  Patient transferred to: PACU  Comments: Neuromuscular blockade not used after succinylcholine for intubation, spontaneous return of TOF 4/4 with sustained tetany, spontaneous respirations, adequate tidal volumes, followed commands to voice, oropharynx suctioned with soft flexible catheter, extubated atraumatically, extubated with suction, airway patent after extubation.  Oxygen via facemask at 6 liters per minute to PACU. Oxygen tubing connected to wall O2 in PACU, SpO2, NiBP, and EKG monitors and alarms on and functioning, report on patient's clinical status given to PACU RN, RN questions answered.     Handoff Report: Identifed the Patient, Identified the Reponsible Provider, Reviewed the pertinent medical history, Discussed the surgical course, Reviewed Intra-OP anesthesia mangement and issues during anesthesia, Set expectations for post-procedure period and Allowed opportunity for questions and acknowledgement of understanding      Vitals:  Vitals Value Taken Time   BP     Temp     Pulse     Resp     SpO2 100 % 09/23/21 2010   Vitals shown include unvalidated device data.    Electronically Signed By: CHERRY Gómez CRNA  September 23, 2021  8:12 PM

## 2021-09-24 NOTE — PROGRESS NOTES
Pt has full coverage at 100% for iv abx through their [facility] PMAP plan. There may be a copay upon dispense.          (SONAL Wan) In reference to admission date 9/23/2021 to check for iv abx coverage.              Please contact Intake with any questions, [phone] or In Basket pool, [facility] ([phone]).

## 2021-09-25 ENCOUNTER — APPOINTMENT (OUTPATIENT)
Dept: PHYSICAL THERAPY | Facility: CLINIC | Age: 36
End: 2021-09-25
Attending: ORTHOPAEDIC SURGERY
Payer: COMMERCIAL

## 2021-09-25 LAB
BACTERIA SNV CULT: ABNORMAL
BACTERIA WND CULT: ABNORMAL
CREAT SERPL-MCNC: 0.73 MG/DL (ref 0.66–1.25)
CRP SERPL-MCNC: 28 MG/L (ref 0–8)
ERYTHROCYTE [DISTWIDTH] IN BLOOD BY AUTOMATED COUNT: 13.1 % (ref 10–15)
ERYTHROCYTE [SEDIMENTATION RATE] IN BLOOD BY WESTERGREN METHOD: 61 MM/HR (ref 0–15)
GFR SERPL CREATININE-BSD FRML MDRD: >90 ML/MIN/1.73M2
GLUCOSE BLD-MCNC: 91 MG/DL (ref 70–99)
HCT VFR BLD AUTO: 36.2 % (ref 40–53)
HGB BLD-MCNC: 11.8 G/DL (ref 13.3–17.7)
MCH RBC QN AUTO: 27.6 PG (ref 26.5–33)
MCHC RBC AUTO-ENTMCNC: 32.6 G/DL (ref 31.5–36.5)
MCV RBC AUTO: 85 FL (ref 78–100)
PLATELET # BLD AUTO: 279 10E3/UL (ref 150–450)
RBC # BLD AUTO: 4.27 10E6/UL (ref 4.4–5.9)
WBC # BLD AUTO: 4.5 10E3/UL (ref 4–11)

## 2021-09-25 PROCEDURE — 258N000003 HC RX IP 258 OP 636: Performed by: INTERNAL MEDICINE

## 2021-09-25 PROCEDURE — 82565 ASSAY OF CREATININE: CPT | Performed by: INTERNAL MEDICINE

## 2021-09-25 PROCEDURE — 120N000001 HC R&B MED SURG/OB

## 2021-09-25 PROCEDURE — 82947 ASSAY GLUCOSE BLOOD QUANT: CPT | Performed by: INTERNAL MEDICINE

## 2021-09-25 PROCEDURE — 97530 THERAPEUTIC ACTIVITIES: CPT | Mod: GP

## 2021-09-25 PROCEDURE — 99232 SBSQ HOSP IP/OBS MODERATE 35: CPT | Performed by: INTERNAL MEDICINE

## 2021-09-25 PROCEDURE — 250N000013 HC RX MED GY IP 250 OP 250 PS 637: Performed by: INTERNAL MEDICINE

## 2021-09-25 PROCEDURE — 85652 RBC SED RATE AUTOMATED: CPT | Performed by: HOSPITALIST

## 2021-09-25 PROCEDURE — 250N000011 HC RX IP 250 OP 636: Performed by: SPECIALIST

## 2021-09-25 PROCEDURE — 36415 COLL VENOUS BLD VENIPUNCTURE: CPT | Performed by: HOSPITALIST

## 2021-09-25 PROCEDURE — 85027 COMPLETE CBC AUTOMATED: CPT | Performed by: HOSPITALIST

## 2021-09-25 PROCEDURE — 250N000011 HC RX IP 250 OP 636: Performed by: INTERNAL MEDICINE

## 2021-09-25 PROCEDURE — 86140 C-REACTIVE PROTEIN: CPT | Performed by: HOSPITALIST

## 2021-09-25 PROCEDURE — 97161 PT EVAL LOW COMPLEX 20 MIN: CPT | Mod: GP

## 2021-09-25 PROCEDURE — 97116 GAIT TRAINING THERAPY: CPT | Mod: GP

## 2021-09-25 RX ADMIN — TRAZODONE HYDROCHLORIDE 150 MG: 50 TABLET ORAL at 01:20

## 2021-09-25 RX ADMIN — VANCOMYCIN HYDROCHLORIDE 2000 MG: 5 INJECTION, POWDER, LYOPHILIZED, FOR SOLUTION INTRAVENOUS at 01:10

## 2021-09-25 RX ADMIN — CEFAZOLIN SODIUM 2 G: 2 INJECTION, SOLUTION INTRAVENOUS at 04:16

## 2021-09-25 RX ADMIN — VANCOMYCIN HYDROCHLORIDE 1500 MG: 5 INJECTION, POWDER, LYOPHILIZED, FOR SOLUTION INTRAVENOUS at 13:17

## 2021-09-25 ASSESSMENT — ACTIVITIES OF DAILY LIVING (ADL)
ADLS_ACUITY_SCORE: 6
ADLS_ACUITY_SCORE: 5
ADLS_ACUITY_SCORE: 6

## 2021-09-25 NOTE — PROGRESS NOTES
Orthopedic Surgery  Zander Blunt  9/24/2021  Admit Date:  9/23/2021  POD 2 Days Post-Op  S/P Procedure(s):  IRRIGATION AND DEBRIDEMENT, RIGHT FIRST METATARSAL PHALANGEAL JOINT    Patient resting comfortably in bed.    Pain controlled.  Tolerating oral intake.    Denies nausea or vomiting  Denies chest pain or shortness of breath  No events overnight.      Alert and orient to person, place, and time.  Vital Sign Ranges  /73 (BP Location: Left arm)   Pulse 56   Temp 97.7  F (36.5  C) (Oral)   Resp 16   Wt 87.9 kg (193 lb 12.6 oz)   SpO2 100%   BMI 26.28 kg/m        Dressing is clean, dry, and intact.   Minimal erythema of the surrounding skin.   Bilateral calves are soft, non-tender.  Bilateral lower extremity is NVI.  Sensation intact bilateral lower extremities  5/5 motor with resisted dorsi and plantar flexion bilaterally  +Dp pulse    Labs:  Hemoglobin   Date Value Ref Range Status   09/25/2021 11.8 (L) 13.3 - 17.7 g/dL Final   07/03/2014 12.0 (L) 13.3 - 17.7 g/dL Final       WBC Count   Date Value Ref Range Status   09/25/2021 4.5 4.0 - 11.0 10e3/uL Final         A/P  1. S/p 1st toe MTP I&D (septic native joint)   Continue amb and SCDs for DVT prophylaxis.     Mobilize with PT/OT    WBAT with post-op shoe   Leave dressings intact   IV abx per ID.     Continue current pain regimen.    2. Disposition   Anticipate d/c to home based on abx plan.    Kjerstin Foss PA-C [facility]

## 2021-09-25 NOTE — PROGRESS NOTES
SW: Writer gave resources to pts RN to give to pt, at the request of CD Counseling.     MALLORIE Mckeon

## 2021-09-25 NOTE — PROGRESS NOTES
[facility]  Infectious Disease Progress Note          Assessment and Plan:     Date of Admission:  9/23/2021  Date of Consult : 09/24/21     Assessment:  1. S.aureus septic arthritis r 1st MTP joint , likely related to hematogenous seeding -does have substance abuse history. Blood cxs negative thus far. No clinical symptoms to suggest active bacteremia or endocarditis at this time  2. Chronic anemia  3. Chronic medical conditions - ADHD, history of right leg cellulitis, chemical dependency, depression, hypertension, panic disorder     Recommendations:  1. neg blood cxs at 2 days.   2. synovial fluid  OR cx sensitivities is MRSA pure cx  3. vanco  4. Check HIV/Hepatitis B/C serologies in view of substance abuse (added to specimen in lab)  5. PICC placement wait to tomorrow,  will be challenging given substance abuse history. Will need IV antibiotics for several weeks.  Discussed situation and MRSA           Interval History:   no new complaints and doing well; no cp, sob, n/v/d, or abd pain. T down BC neg cx is MRSA              Medications:       acetaminophen  975 mg Oral Q8H     polyethylene glycol  17 g Oral Daily     senna-docusate  1 tablet Oral BID     sodium chloride (PF)  3 mL Intracatheter Q8H     vancomycin  2,000 mg Intravenous Once                  Physical Exam:   Blood pressure 127/82, pulse 65, temperature 98.5  F (36.9  C), temperature source Oral, resp. rate 16, weight 87.9 kg (193 lb 12.6 oz), SpO2 98 %.  Wt Readings from Last 2 Encounters:   09/24/21 87.9 kg (193 lb 12.6 oz)   07/09/21 97.5 kg (215 lb)     Vital Signs with Ranges  Temp:  [97.7  F (36.5  C)-98.5  F (36.9  C)] 98.5  F (36.9  C)  Pulse:  [55-65] 65  Resp:  [16] 16  BP: (117-130)/(73-82) 127/82  SpO2:  [98 %-100 %] 98 %    Constitutional: Awake, alert, cooperative, no apparent distress   Lungs: Clear to auscultation bilaterally, no crackles or wheezing   Cardiovascular: Regular rate and rhythm, normal S1 and  S2, and no murmur noted   Abdomen: Normal bowel sounds, soft, non-distended, non-tender   Skin: No rashes, no cyanosis, no edema foot wrapped   Other:           Data:   All microbiology laboratory data reviewed.  Recent Labs   Lab Test 09/25/21 0546 09/24/21 0928 09/23/21 1926   WBC 4.5 5.7 5.8   HGB 11.8* 12.2* 12.3*   HCT 36.2* 37.4* 37.4*   MCV 85 85 84    278 252     Recent Labs   Lab Test 09/24/21 0928 09/23/21 1926 02/24/14  1810   CR 0.74 0.78 0.84     Recent Labs   Lab Test 09/25/21 0546   SED 61*     No lab results found.    Invalid input(s): UC

## 2021-09-25 NOTE — PROGRESS NOTES
Patient A&Ox4, VSS on RA. Up independent Room. Pt refused bed alarm. Dressing CDI. Minimal pain refused pain med & scheduled tylenol. Contact precaution initiated for MRSA on right foot  & IV Vanco started.will continue monitored

## 2021-09-25 NOTE — PHARMACY-VANCOMYCIN DOSING SERVICE
Pharmacy Vancomycin Initial Note  Date of Service 2021  Patient's  1985  35 year old, male    Indication: Osteomyelitis    Current estimated CrCl = Estimated Creatinine Clearance: 169.9 mL/min (based on SCr of 0.74 mg/dL).    Creatinine for last 3 days  2021:  7:26 PM Creatinine 0.78 mg/dL  2021:  9:28 AM Creatinine 0.74 mg/dL    Recent Vancomycin Level(s) for last 3 days  No results found for requested labs within last 72 hours.      Vancomycin IV Administrations (past 72 hours)                   vancomycin 2000 mg in 0.9% NaCl 500 ml intermittent infusion 2,000 mg (mg) 2,000 mg New Bag 21 0110                Nephrotoxins and other renal medications (From now, onward)    Start     Dose/Rate Route Frequency Ordered Stop    21 0100  vancomycin 2000 mg in 0.9% NaCl 500 ml intermittent infusion 2,000 mg      2,000 mg  over 2 Hours Intravenous ONCE 21 0022      21 2147  ibuprofen (ADVIL/MOTRIN) tablet 600 mg      600 mg Oral EVERY 6 HOURS PRN 21 2147            Contrast Orders - past 72 hours (72h ago, onward)    None                    Plan:  1. Start vancomycin  1500 mg IV q12h.   2. Vancomycin monitoring method: AUC  3. Vancomycin therapeutic monitoring goal: 400-600 mg*h/L  4. Pharmacy will check vancomycin levels as appropriate in 1-3 Days.    5. Serum creatinine levels will be ordered daily for the first week of therapy and at least twice weekly for subsequent weeks.      Ramirez Bliss [facility]

## 2021-09-25 NOTE — CONSULTS
Care Management Initial Consult    General Information  Assessment completed with: Patient,    Type of CM/SW Visit: Initial Assessment    Primary Care Provider verified and updated as needed: Yes   Readmission within the last 30 days: no previous admission in last 30 days      Reason for Consult: discharge planning  Advance Care Planning:       General Information Comments:  (IV drug abuse and needs IV abx at discharge)    Communication Assessment  Patient's communication style: spoken language (English or Bilingual)    Hearing Difficulty or Deaf: no   Wear Glasses or Blind: no    Cognitive  Cognitive/Neuro/Behavioral:drug user, withdrawn                      Living Environment:   Homeless       Family/Social Support:     No family support identified by patient             Description of Support System:           Current Resources:   Patient receiving home care services:     no  Community Resources:    Equipment currently used at home: none  Supplies currently used at home:  none    Employment/Financial:  Employment Status:          Financial Concerns:         Homeless    Lifestyle & Psychosocial Needs:  Social Determinants of Health     Tobacco Use: Medium Risk     Smoking Tobacco Use: Former Smoker     Smokeless Tobacco Use: Never Used   Alcohol Use:      Frequency of Alcohol Consumption:      Average Number of Drinks:      Frequency of Binge Drinking:    Financial Resource Strain:      Difficulty of Paying Living Expenses:    Food Insecurity:      Worried About Running Out of Food in the Last Year:      Ran Out of Food in the Last Year:    Transportation Needs:      Lack of Transportation (Medical):      Lack of Transportation (Non-Medical):    Physical Activity:      Days of Exercise per Week:      Minutes of Exercise per Session:    Stress:      Feeling of Stress :    Social Connections:      Frequency of Communication with Friends and Family:      Frequency of Social Gatherings with Friends and Family:       Attends Anabaptist Services:      Active Member of Clubs or Organizations:      Attends Club or Organization Meetings:      Marital Status:    Intimate Partner Violence:      Fear of Current or Ex-Partner:      Emotionally Abused:      Physically Abused:      Sexually Abused:    Depression:      PHQ-2 Score:    Housing Stability:      Unable to Pay for Housing in the Last Year:      Number of Places Lived in the Last Year:      Unstable Housing in the Last Year:        Functional Status:  Prior to admission patient needed assistance:              Mental Health Status:          Chemical Dependency Status:            Known current IV drug user    Values/Beliefs:  Spiritual, Cultural Beliefs, Anabaptist Practices, Values that affect care:                 Additional Information:  Writer notes Zander is a IV drug user who needs IV abx, so not likely to find home infusion who would accept him or TCU.  Discharge plans are very limited.  Writer also put a consult in as there was not a consult for care management.    Writer met with patient at bedside.  Patient answers questions appropriately, and is withdrawn and has flat affect.  Patient admits to IV drug use, he denies any close friends or family and is currently homeless.  Given these circumstances, the patient is not a candidate for home infusion.    Discussed with patient that there is one facility in the city that may take him with PICC and IV abx if he is not using drugs and is willing to stay at a facility.  Patient is in agreement.  Writer is aware of one facility that may take him ([facility]  The other choice is once a day IV abx, given by PIV in IC daily.    Patient would also like info on sober living.    CC will continue to work with patient to find safe place for infusion therapy and recovery.    Eli Mar RN

## 2021-09-25 NOTE — PLAN OF CARE
Pt A&Ox4. VSS. CMS intact, dressing to RLE intact. Denies pain, declined sched Tylenol. Receiving IV Vanco, otherwise SL. Up ind in room, refused bed alarm. Discharge pending abx plan.

## 2021-09-25 NOTE — PROGRESS NOTES
09/25/21 1400   Quick Adds   Type of Visit Initial PT Evaluation   Living Environment   People in home alone   Current Living Arrangements homeless   Living Environment Comments Patient recently kicked out of home post relapse   Self-Care   Usual Activity Tolerance excellent   Regular Exercise Yes   Activity/Exercise Type running/jogging  (weight lifting)   Exercise Amount/Frequency 30 mins   Equipment Currently Used at Home none   Disability/Function   Wear Glasses or Blind no   Fall history within last six months no   General Information   Onset of Illness/Injury or Date of Surgery 09/23/21   Referring Physician Khalif Oconnell MD    Patient/Family Therapy Goals Statement (PT) To walk without FWW   Pertinent History of Current Problem (include personal factors and/or comorbidities that impact the POC) s/p emergent surgery for septic right first metatarsophalangeal joint infection with orthopedic surgery, PMHx of depression, anxiety and substance abuse including IV drug use   Existing Precautions/Restrictions fall   Weight-Bearing Status - LLE full weight-bearing   Weight-Bearing Status - RLE weight-bearing as tolerated   Cognition   Orientation Status (Cognition) oriented x 4   Pain Assessment   Patient Currently in Pain No   Integumentary/Edema   Integumentary/Edema Comments ACE bandage around R foot   Posture    Posture Forward head position   Range of Motion (ROM)   ROM Quick Adds ROM WNL   Strength   Manual Muscle Testing Quick Adds Able to perform R SLR;Able to perform L SLR;Strength WNL   Bed Mobility   Bed Mobility supine-sit;sit-supine   Supine-Sit [place] (Bed Mobility) independent   Sit-Supine [place] (Bed Mobility) independent   Transfers   Transfers sit-stand transfer   Sit-Stand Transfer   Sit-Stand [place] (Transfers) modified independence   Gait/Stairs (Locomotion)   [place] Level (Gait) supervision;verbal cues   Assistive Device (Gait) walker, standard   Distance in Feet  (Required for LE Total Joints) 115   Comment (Gait/Stairs) Patient ambulated x20 ft with FWW and SBA demonstrating NWB R LE with swing to pattern. Cued to attempt WB on R LE with patient able ambulate with step through pattern SBA.   Balance   Balance no deficits were identified   Sensory Examination   Sensory Perception WNL   Clinical Impression   Criteria for Skilled Therapeutic Intervention yes, treatment indicated   PT Diagnosis (PT) Impaired ambulation   Influenced by the following impairments WBAT   Functional limitations due to impairments WBAT s/p I & D R MTP   Clinical Presentation Stable/Uncomplicated   Clinical Presentation Rationale Due to current status and PMH    Clinical Decision Making (Complexity) low complexity   Therapy Frequency (PT) Daily   Predicted Duration of Therapy Intervention (days/wks) 2   Planned Therapy Interventions (PT) neuromuscular re-education;stair training;strengthening;transfer training;progressive activity/exercise   Risk & Benefits of therapy have been explained evaluation/treatment results reviewed;care plan/treatment goals reviewed;risks/benefits reviewed;current/potential barriers reviewed;participants voiced agreement with care plan;participants included;patient   PT Discharge Planning    PT Discharge Recommendation (DC Rec) home   PT Rationale for DC Rec Patient would likely be safe to discharge from hospital, however he currently does not have a residence. Trialing stairs tomorrow so he could D/C to a home with stairs.   PT Brief overview of current status  Mod I w/ FWW for transfers, supervision w/ FWW for ambulation   Total Evaluation Time   Total Evaluation Time (Minutes) 5

## 2021-09-25 NOTE — PROVIDER NOTIFICATION
.MD Notification    Notified Person: MD     Notified Person Name: BUDDY    Notification Date/Time: 9/24/21 9:35pm    Notification Interaction:Paged     Purpose of Notification:  Critical Lab , Rt foot Synovial fluid culture growing MRSA .    Orders Received: awaiting call     Comments:see MD note

## 2021-09-25 NOTE — PROGRESS NOTES
[facility]    Hospitalist Progress Note    Assessment & Plan   Zander Blunt is a 35 year old mal with PMHx of depression, anxiety and substance abuse including IV drug use who was admitted on 9/23/2021 after undergoing an emergent surgery for septic right first metatarsophalangeal joint infection with orthopedic surgery     Septic arthritis of the first metatarsophalangeal joint dt staph aureus, s/p I&D 9/23/21  * Preceding current foot infection, patient had an infection in his arm (a week prior) related to IV drug use. He was given doxycycline which he took for a week prior to presentation. He then developed his current foot/toe issues. He does not inject in his foot.   * Had been seen in ortho clinic on 9/22. At that time, ortho thought his foot pain was gout flare and had cortisone injected after aspiration. Fluid cx showed 389k WBC and staph aureus; patient was directed to come to hospital for intervention.   * Underwent I&D of right first MTP on 9/23 per ortho.  * Echo this stay: EF 60-65%, mild concentric LVH, RV normal. LV mildly dilated, no valvular vegetations seen. IVC normal in size.  * HIV neg, Hep B Ab+, hep C nonreactive.  * Initially managed with IV Ancef, changed to Vancomycin 9/25 after wound cultures were found to grow staph aureus (MRSA).  -- blood cultures remain neg  -- cont IV Vanco, per ID will need PICC for extended IV abx at discharge  -- routine postop cares per ortho including, dressing changes; WBAT with postop shoe  -- pain managed with sched Tylenol, prn oxycodone while here (no plans to prescribe at discharge)  -- cont scheduled bowel regimen in place     Depression  Anxiety  Chronic and stable on home meds including Xanax and Trazodone     Anemia, likely chronic  Hgb low but stable at 12 this stay.      Hx Substance abuse  Hx IV drug use. Reportedly treated in 2006/2007 per records  Met with chem dep counselor this stay. Will follow up after discharge for  outpatient CD treatment.     FEN: no IVFs, lytes stable, regular diet  DVT Prophylaxis: PCDs  Code Status: Full Code    Disposition: Discharge date unclear, pending plans for abx -- possibly 1-2d. Will discuss with SW possible options for OP IV abx (at TCU?) vs need to remain hospitalized for duration of IV abx treatment.    Marcy Paez    Interval History   Seen this afternoon. Resting comfortably. No specific complaints. Denies cp/sob/cough, abd pain/n/v.     -Data reviewed today: I reviewed all new labs and imaging results over the last 24 hours. I personally reviewed no images or EKG's today.    Physical Exam   Temp: 98.5  F (36.9  C) Temp src: Oral BP: 127/82 Pulse: 65   Resp: 16 SpO2: 98 % O2 Device: None (Room air)    Vitals:    09/24/21 0642   Weight: 87.9 kg (193 lb 12.6 oz)     Vital Signs with Ranges  Temp:  [97.7  F (36.5  C)-98.5  F (36.9  C)] 98.5  F (36.9  C)  Pulse:  [55-65] 65  Resp:  [16] 16  BP: (117-130)/(73-82) 127/82  SpO2:  [98 %-100 %] 98 %  No intake/output data recorded.    Constitutional: Resting comfortably, alert and conversing appropriately,NAD  Respiratory: CTAB, no wheeze/rales/rhonchi, no increased work of breathing  Cardiovascular: HRRR, no MGR, no LE edema  GI: S, NT, ND, +BS  Skin/Integumen: warm/dry, R foot wrapped and not directly examined by me  Other:      Medications       acetaminophen  975 mg Oral Q8H     polyethylene glycol  17 g Oral Daily     senna-docusate  1 tablet Oral BID     sodium chloride (PF)  3 mL Intracatheter Q8H     vancomycin  1,500 mg Intravenous Q12H       Data   Recent Labs   Lab 09/25/21  0546 09/24/21  0952 09/24/21  0928 09/23/21 1926 09/23/21 1926   WBC 4.5  --  5.7  --  5.8   HGB 11.8*  --  12.2*  --  12.3*   MCV 85  --  85  --  84     --  278  --  252   NA  --   --  140  --  137   POTASSIUM  --   --  4.2  --  4.1   CHLORIDE  --   --  107  --  105   CO2  --   --  28  --  30   BUN  --   --  11  --  11   CR 0.73  --  0.74  --  0.78    ANIONGAP  --   --  5  --  2*   CAIT  --   --  8.5  --  9.1   GLC 91 116* 122*   < > 115*    < > = values in this interval not displayed.       No results found for this or any previous visit (from the past 24 hour(s)).

## 2021-09-26 ENCOUNTER — APPOINTMENT (OUTPATIENT)
Dept: PHYSICAL THERAPY | Facility: CLINIC | Age: 36
End: 2021-09-26
Payer: COMMERCIAL

## 2021-09-26 LAB
CREAT SERPL-MCNC: 0.71 MG/DL (ref 0.66–1.25)
GFR SERPL CREATININE-BSD FRML MDRD: >90 ML/MIN/1.73M2
VANCOMYCIN SERPL-MCNC: 7 MG/L

## 2021-09-26 PROCEDURE — 258N000003 HC RX IP 258 OP 636: Performed by: INTERNAL MEDICINE

## 2021-09-26 PROCEDURE — 97116 GAIT TRAINING THERAPY: CPT | Mod: GP

## 2021-09-26 PROCEDURE — 80202 ASSAY OF VANCOMYCIN: CPT | Performed by: INTERNAL MEDICINE

## 2021-09-26 PROCEDURE — 99232 SBSQ HOSP IP/OBS MODERATE 35: CPT | Performed by: INTERNAL MEDICINE

## 2021-09-26 PROCEDURE — 250N000011 HC RX IP 250 OP 636: Performed by: INTERNAL MEDICINE

## 2021-09-26 PROCEDURE — 120N000001 HC R&B MED SURG/OB

## 2021-09-26 PROCEDURE — 82565 ASSAY OF CREATININE: CPT | Performed by: INTERNAL MEDICINE

## 2021-09-26 PROCEDURE — 250N000013 HC RX MED GY IP 250 OP 250 PS 637: Performed by: INTERNAL MEDICINE

## 2021-09-26 PROCEDURE — 250N000013 HC RX MED GY IP 250 OP 250 PS 637: Performed by: ORTHOPAEDIC SURGERY

## 2021-09-26 PROCEDURE — 36415 COLL VENOUS BLD VENIPUNCTURE: CPT | Performed by: INTERNAL MEDICINE

## 2021-09-26 RX ORDER — CEFAZOLIN SODIUM 1 G/50ML
1750 SOLUTION INTRAVENOUS EVERY 12 HOURS
Status: COMPLETED | OUTPATIENT
Start: 2021-09-26 | End: 2021-10-09

## 2021-09-26 RX ORDER — OXYCODONE HYDROCHLORIDE 5 MG/1
2.5-5 TABLET ORAL EVERY 4 HOURS PRN
Qty: 20 TABLET | Refills: 0 | Status: SHIPPED | OUTPATIENT
Start: 2021-09-26 | End: 2021-09-26

## 2021-09-26 RX ADMIN — SENNOSIDES AND DOCUSATE SODIUM 1 TABLET: 8.6; 5 TABLET ORAL at 10:02

## 2021-09-26 RX ADMIN — POLYETHYLENE GLYCOL 3350 17 G: 17 POWDER, FOR SOLUTION ORAL at 10:02

## 2021-09-26 RX ADMIN — VANCOMYCIN HYDROCHLORIDE 1750 MG: 1 INJECTION, POWDER, LYOPHILIZED, FOR SOLUTION INTRAVENOUS at 16:09

## 2021-09-26 RX ADMIN — ALPRAZOLAM 1 MG: 1 TABLET ORAL at 21:44

## 2021-09-26 ASSESSMENT — ACTIVITIES OF DAILY LIVING (ADL)
ADLS_ACUITY_SCORE: 6

## 2021-09-26 NOTE — PLAN OF CARE
Physical Therapy Discharge Summary    Reason for therapy discharge:    Patient is safe to D/C from PT, IND with bed mobility, transfers and ambulation, supervision on stairs d/t IV line.     Progress towards therapy goal(s). See goals on Care Plan in [facility] electronic health record for goal details.  Goals met.     Therapy recommendation(s):    No further therapy is recommended.

## 2021-09-26 NOTE — PLAN OF CARE
VSS. Ind in room. CMS intact. RLE dressing CDI. Denies pain. Stool softeners given this am, declined sched Tylenol. Contact precautions- MRSA. Minimal intake, PO encouraged. SL between abx therapy. Vanco increased this afternoon. Discharge plan pending abx plan. Continue to monitor.

## 2021-09-26 NOTE — PLAN OF CARE
Contact Precautions Maintained; active MRSA. Pt is AOx4. VSS on RA. Independent in room; refuses bed alarm. Denied pain; refused morning tylenol. Tolerating regular diet; pt needs encouragement to eat. Continent and voiding in the BR. R BLE dressing; CDI. PIV; SL with intermittent abx. Discharge pending abx plan; continue to monitor

## 2021-09-26 NOTE — PROGRESS NOTES
[facility]    Hospitalist Progress Note    Assessment & Plan   Zander Blunt is a 35 year old mal with PMHx of depression, anxiety and substance abuse including IV drug use who was admitted on 9/23/2021 after undergoing an emergent surgery for septic right first metatarsophalangeal joint infection with orthopedic surgery     Septic arthritis of the first metatarsophalangeal joint dt staph aureus, s/p I&D 9/23/21  * Preceding current foot infection, patient had an infection in his arm (a week prior) related to IV drug use. He was given doxycycline which he took for a week prior to presentation. He then developed his current foot/toe issues. He does not inject in his foot.   * Had been seen in ortho clinic on 9/22. At that time, ortho thought his foot pain was gout flare and had cortisone injected after aspiration. Fluid cx showed 389k WBC and staph aureus; patient was directed to come to hospital for intervention.   * Underwent I&D of right first MTP on 9/23 per ortho.  * Echo this stay: EF 60-65%, mild concentric LVH, RV normal. LV mildly dilated, no valvular vegetations seen. IVC normal in size.  * HIV neg, Hep B Ab+, hep C nonreactive.  * Initially managed with IV Ancef, changed to Vancomycin 9/25 after wound cultures were found to grow staph aureus (MRSA).  -- blood cultures remain neg  -- cont IV Vanco, anticipate several week course of abx  -- discharge abx plan complicated given hx of IV drug use -- not candidate for home infusion, not candidate for any TCUs; present options include remain in hospital for duration of treatment with PICC line vs OP infusion center for daily abx infusion via peripheral IV   -- routine postop cares per ortho including, dressing changes; WBAT with postop shoe  -- not needing anything for pain postop (hasn't used any Tylenol or opioids)     Depression  Anxiety  Chronic and stable on home meds including Xanax and Trazodone     Anemia, likely chronic  Hgb  low but stable at 12 this stay.      Hx Substance abuse  Hx IV drug use. Reportedly treated in 2006/2007 per records  Met with chem dep counselor this stay. Will follow up after discharge for outpatient CD treatment.     FEN: no IVFs, lytes stable, regular diet  DVT Prophylaxis: PCDs  Code Status: Full Code    Disposition: Discharge date unclear, pending plans for abx. Discussed with SW today. Not a candidate for home infusion, not candidate for any TCUs; present options for include remain in hospital for duration of treatment with PICC line vs OP infusion center for daily abx infusion via peripheral IV     Marcy Paez    Interval History   Seen this afternoon. Resting comfortably. No specific complaints. Denies cp/sob/cough, abd pain/n/v.     -Data reviewed today: I reviewed all new labs and imaging results over the last 24 hours. I personally reviewed no images or EKG's today.    Physical Exam   Temp: 98.4  F (36.9  C) Temp src: Oral BP: (!) 141/85 Pulse: 62   Resp: 16 SpO2: 97 % O2 Device: None (Room air)    Vitals:    09/24/21 0642   Weight: 87.9 kg (193 lb 12.6 oz)     Vital Signs with Ranges  Temp:  [98.4  F (36.9  C)-98.6  F (37  C)] 98.4  F (36.9  C)  Pulse:  [53-66] 62  Resp:  [16] 16  BP: (134-141)/(79-85) 141/85  SpO2:  [97 %-98 %] 97 %  I/O last 3 completed shifts:  In: 240 [P.O.:240]  Out: -     Constitutional: Resting comfortably, alert and conversing appropriately,NAD  Respiratory: CTAB, no wheeze/rales/rhonchi, no increased work of breathing  Cardiovascular: HRRR, no MGR, no LE edema  GI: S, NT, ND, +BS  Skin/Integumen: warm/dry, R foot wrapped and not directly examined by me  Other:      Medications       acetaminophen  975 mg Oral Q8H     polyethylene glycol  17 g Oral Daily     senna-docusate  1 tablet Oral BID     sodium chloride (PF)  3 mL Intracatheter Q8H     vancomycin  1,750 mg Intravenous Q12H       Data   Recent Labs   Lab 09/26/21  1227 09/25/21  0546 09/24/21  0952  09/24/21 0928 09/23/21 1926 09/23/21 1926   WBC  --  4.5  --  5.7  --  5.8   HGB  --  11.8*  --  12.2*  --  12.3*   MCV  --  85  --  85  --  84   PLT  --  279  --  278  --  252   NA  --   --   --  140  --  137   POTASSIUM  --   --   --  4.2  --  4.1   CHLORIDE  --   --   --  107  --  105   CO2  --   --   --  28  --  30   BUN  --   --   --  11  --  11   CR 0.71 0.73  --  0.74   < > 0.78   ANIONGAP  --   --   --  5  --  2*   CAIT  --   --   --  8.5  --  9.1   GLC  --  91 116* 122*   < > 115*    < > = values in this interval not displayed.       No results found for this or any previous visit (from the past 24 hour(s)).

## 2021-09-26 NOTE — PHARMACY-VANCOMYCIN DOSING SERVICE
Pharmacy Vancomycin Note  Date of Service 2021  Patient's  1985   35 year old, male    Indication: Osteomyelitis  Day of Therapy: 2  Current vancomycin regimen:  1500 mg IV q12h  Current vancomycin monitoring method: AUC  Current vancomycin therapeutic monitoring goal: 400-600 mg*h/L    Current estimated CrCl = Estimated Creatinine Clearance: 177.1 mL/min (based on SCr of 0.71 mg/dL).    Creatinine for last 3 days  2021:  7:26 PM Creatinine 0.78 mg/dL  2021:  9:28 AM Creatinine 0.74 mg/dL  2021:  5:46 AM Creatinine 0.73 mg/dL  2021: 12:27 PM Creatinine 0.71 mg/dL    Recent Vancomycin Levels (past 3 days)  2021: 12:27 PM Vancomycin 7.0 mg/L    Vancomycin IV Administrations (past 72 hours)                   vancomycin 1500 mg in 0.9% NaCl 250 ml intermittent infusion 1,500 mg ()  Restarted 21 0157     1,500 mg New Bag 21 1317    vancomycin 2000 mg in 0.9% NaCl 500 ml intermittent infusion 2,000 mg (mg) 2,000 mg New Bag 21 0110                Nephrotoxins and other renal medications (From now, onward)    Start     Dose/Rate Route Frequency Ordered Stop    21 1400  vancomycin (VANCOCIN) 1,750 mg in sodium chloride 0.9 % 500 mL intermittent infusion      1,750 mg  over 2 Hours Intravenous EVERY 12 HOURS 21 1340      21 2147  ibuprofen (ADVIL/MOTRIN) tablet 600 mg      600 mg Oral EVERY 6 HOURS PRN 21 2147               Contrast Orders - past 72 hours (72h ago, onward)    None          Interpretation of levels and current regimen:  Vancomycin level is reflective of AUC at bottom of range at 401    Has serum creatinine changed greater than 50% in last 72 hours: No    Urine output:  unable to determine    Renal Function: Stable    Loading dose: N/A  Regimen: 1500 mg IV every 12 hours.  Start time: 13:57 on 2021  Exposure target: AUC24 (range)400-600 mg/L.hr   AUC24,ss: 401 mg/L.hr  Probability of AUC24 > 400: 51 %  Ctrough,ss:  8.9 mg/L  Probability of Ctrough,ss > 20: 0 %  Probability of nephrotoxicity (Lodise HAO 2009): 5 %      Plan:  1. Increase Dose to 1750 mg q12h   2. Vancomycin monitoring method: AUC  3. Vancomycin therapeutic monitoring goal: 400-600 mg*h/L  4. Pharmacy will check vancomycin levels as appropriate in 1-3 Days.  5. Serum creatinine levels will be ordered daily for the first week of therapy and at least twice weekly for subsequent weeks.    Sarah Hong [facility]

## 2021-09-26 NOTE — PLAN OF CARE
Orientation: A & O x 4. Contact precautions for MRSA  VS/ O2/ IV: VSS on RA. IV SL.   Mobility: Independent w/ in room, WBAT to RLE.   Pain Management: Denies  Diet: Regular, minimal intake.   Bowel/ Bladder: Continent of bowel and bladder.   Skin: ACE wrap RLE c/d/I.   CMS: Intact.   Discharge/ Plan:  Needs to be on IV abx. Pending placement. Educational information of recovery resources given to pt.

## 2021-09-26 NOTE — PROGRESS NOTES
[facility]  Infectious Disease Progress Note          Assessment and Plan:     Date of Admission:  9/23/2021  Date of Consult : 09/24/21     Assessment:  1. S.aureus septic arthritis r 1st MTP joint , likely related to hematogenous seeding -does have substance abuse history. Blood cxs negative thus far. No clinical symptoms to suggest active bacteremia or endocarditis at this time  2. Chronic anemia  3. Chronic medical conditions - ADHD, history of right leg cellulitis, chemical dependency, depression, hypertension, panic disorder     Recommendations:  1. neg blood cxs    2. synovial fluid  OR cx sensitivities is MRSA pure cx  3. vanco restarted  4. Check HIV/Hepatitis B/C serologies in view of substance abuse, all neg  5. PICC placement when disposition plan, is Ok with neg BC any time,  will be challenging given substance abuse history. Will need IV antibiotics for several weeks.  Discussed situation and MRSA           Interval History:   no new complaints and doing well; no cp, sob, n/v/d, or abd pain. T down BC neg cx is MRSA              Medications:       acetaminophen  975 mg Oral Q8H     polyethylene glycol  17 g Oral Daily     senna-docusate  1 tablet Oral BID     sodium chloride (PF)  3 mL Intracatheter Q8H     vancomycin  1,500 mg Intravenous Q12H                  Physical Exam:   Blood pressure (!) 141/85, pulse 62, temperature 98.4  F (36.9  C), temperature source Oral, resp. rate 16, weight 87.9 kg (193 lb 12.6 oz), SpO2 97 %.  Wt Readings from Last 2 Encounters:   09/24/21 87.9 kg (193 lb 12.6 oz)   07/09/21 97.5 kg (215 lb)     Vital Signs with Ranges  Temp:  [98.4  F (36.9  C)-98.6  F (37  C)] 98.4  F (36.9  C)  Pulse:  [53-66] 62  Resp:  [16] 16  BP: (134-141)/(79-85) 141/85  SpO2:  [97 %-98 %] 97 %    Constitutional: Awake, alert, cooperative, no apparent distress   Lungs: Clear to auscultation bilaterally, no crackles or wheezing   Cardiovascular: Regular rate and rhythm,  normal S1 and S2, and no murmur noted   Abdomen: Normal bowel sounds, soft, non-distended, non-tender   Skin: No rashes, no cyanosis, no edema foot wrapped   Other:           Data:   All microbiology laboratory data reviewed.  Recent Labs   Lab Test 09/25/21 0546 09/24/21 0928 09/23/21 1926   WBC 4.5 5.7 5.8   HGB 11.8* 12.2* 12.3*   HCT 36.2* 37.4* 37.4*   MCV 85 85 84    278 252     Recent Labs   Lab Test 09/25/21 0546 09/24/21 0928 09/23/21 1926   CR 0.73 0.74 0.78     Recent Labs   Lab Test 09/25/21 0546   SED 61*     No lab results found.    Invalid input(s): UC

## 2021-09-26 NOTE — PROGRESS NOTES
Orthopedic Surgery  Zander Blunt  9/24/2021  Admit Date:  9/23/2021  POD 3 Days Post-Op  S/P Procedure(s):  IRRIGATION AND DEBRIDEMENT, RIGHT FIRST METATARSAL PHALANGEAL JOINT    Patient resting comfortably in bed.    Pain controlled.  Tolerating oral intake.    Denies nausea or vomiting  Denies chest pain or shortness of breath  No events overnight.      Alert and orient to person, place, and time.  Vital Sign Ranges  /82 (BP Location: Left arm)   Pulse 66   Temp 98.6  F (37  C) (Oral)   Resp 16   Wt 87.9 kg (193 lb 12.6 oz)   SpO2 98%   BMI 26.28 kg/m        Dressing is clean, dry, and intact.   Minimal erythema of the surrounding skin.   Bilateral calves are soft, non-tender.  Bilateral lower extremity is NVI.  Sensation intact bilateral lower extremities  5/5 motor with resisted dorsi and plantar flexion bilaterally  +Dp pulse    Labs:  Hemoglobin   Date Value Ref Range Status   09/25/2021 11.8 (L) 13.3 - 17.7 g/dL Final   07/03/2014 12.0 (L) 13.3 - 17.7 g/dL Final       WBC Count   Date Value Ref Range Status   09/25/2021 4.5 4.0 - 11.0 10e3/uL Final         A/P  1. S/p 1st toe MTP I&D (septic native joint)   Continue amb and SCDs for DVT prophylaxis.     Mobilize with PT/OT    WBAT with post-op shoe   Leave dressings intact   IV abx per ID.     Continue current pain regimen.    2. Disposition   Anticipate d/c to home based on abx plan.    Devon Hummel PA-C

## 2021-09-27 LAB
CREAT SERPL-MCNC: 0.86 MG/DL (ref 0.66–1.25)
GFR SERPL CREATININE-BSD FRML MDRD: >90 ML/MIN/1.73M2

## 2021-09-27 PROCEDURE — 258N000003 HC RX IP 258 OP 636: Performed by: INTERNAL MEDICINE

## 2021-09-27 PROCEDURE — 250N000013 HC RX MED GY IP 250 OP 250 PS 637: Performed by: ORTHOPAEDIC SURGERY

## 2021-09-27 PROCEDURE — 250N000013 HC RX MED GY IP 250 OP 250 PS 637: Performed by: INTERNAL MEDICINE

## 2021-09-27 PROCEDURE — 120N000001 HC R&B MED SURG/OB

## 2021-09-27 PROCEDURE — 36415 COLL VENOUS BLD VENIPUNCTURE: CPT | Performed by: INTERNAL MEDICINE

## 2021-09-27 PROCEDURE — 250N000011 HC RX IP 250 OP 636: Performed by: INTERNAL MEDICINE

## 2021-09-27 PROCEDURE — 99232 SBSQ HOSP IP/OBS MODERATE 35: CPT | Performed by: INTERNAL MEDICINE

## 2021-09-27 PROCEDURE — 82565 ASSAY OF CREATININE: CPT | Performed by: INTERNAL MEDICINE

## 2021-09-27 RX ADMIN — SENNOSIDES AND DOCUSATE SODIUM 1 TABLET: 8.6; 5 TABLET ORAL at 20:15

## 2021-09-27 RX ADMIN — TRAZODONE HYDROCHLORIDE 150 MG: 50 TABLET ORAL at 22:12

## 2021-09-27 RX ADMIN — VANCOMYCIN HYDROCHLORIDE 1750 MG: 1 INJECTION, POWDER, LYOPHILIZED, FOR SOLUTION INTRAVENOUS at 17:56

## 2021-09-27 RX ADMIN — VANCOMYCIN HYDROCHLORIDE 1750 MG: 1 INJECTION, POWDER, LYOPHILIZED, FOR SOLUTION INTRAVENOUS at 03:37

## 2021-09-27 RX ADMIN — ALPRAZOLAM 1 MG: 1 TABLET ORAL at 22:02

## 2021-09-27 ASSESSMENT — ACTIVITIES OF DAILY LIVING (ADL)
ADLS_ACUITY_SCORE: 6
ADLS_ACUITY_SCORE: 5

## 2021-09-27 NOTE — PLAN OF CARE
Pt A&Ox 4. VSS on RA. CMS intact. Dressing c/d/i. Voiding adequately. Reported one loose stool last evening. Up independently in room. No pain noted. WBAT. Contact precautions maintained. IV SL with intermittent Vancomycin infusions. Will continue to monitor.

## 2021-09-27 NOTE — CONSULTS
Linezolid coverage check.  Patient has [facility] Medical Assistance.    Linezolid: $0.    -SJamal Webster, Pharmacy Technician/Liaison, Discharge Pharmacy [phone]

## 2021-09-27 NOTE — PLAN OF CARE
A&Ox4, VSS on RA. CMS intact. Ace/ dressing to RLE; CDI. Independent, steady gait. Denies pain. Voiding adequately in BR, reported last BM yesterday. New IV placed in L forearm. SL between IV Vanco. Contact precautions: MRSA. Plan TBD. Continue to monitor.

## 2021-09-27 NOTE — PROGRESS NOTES
[facility]    Infectious Disease Progress Note    Date of Service : 09/27/2021     Assessment:  1. S.aureus (MRSA) septic arthritis r 1st MTP joint , likely related to hematogenous seeding -substance abuse history is the risk factor for infection. Blood cxs negative thus far. No evidence of endocarditis  2. Chronic anemia  3. Chronic medical conditions - ADHD, history of right leg cellulitis, chemical dependency, depression, hypertension, panic disorder     Recommendations:  1. Continue Vancomycin  2. Will need IV antibiotics for several weeks, treatmen will be challenging given substance abuse history. Discussed this issue at length with the patient  The following options were discussed with the patient:  Option 1  -If PICC is placed, he will need to be at a facility for the duration of IV antibiotics, cannot go home with PICC because he was injecting cocaine until he was hospitalized  Would suggest 2 weeks IV vancomycin until 10/6 then transition to PO Linezolid 600 mg BID for another 2 weeks thereafter (Linezolid is now available as generic, and is inexpensive but should check costs etc prior to discharge)  -option 2  He could be transitioned to Daptomycin 6mg/kg daily infusion for 2 weeks until 10/6 then switch to oral Linezolid for another 2 weeks. Daptomycin is expensive so will need insurance check etc. He can be set up at the infusion center for daily infusions through a peripheral cathter which will need to be placed daily for infusion and then removed after each infusion. Compliance can be an issue.   All above options discussed at length , questions answered.      Danay Campbell MD    Interval History   Resting, no new complaints, no foot pain , able to ambulate without difficulty. Remained afebrile, and is tolerating antibiotics without side effects.    Antimicrobial therapy  9/5 Vancomycin    Physical Exam   Temp: 98.2  F (36.8  C) Temp src: Oral BP: 105/68 Pulse: 61   Resp: 16  SpO2: 97 % O2 Device: None (Room air)    Vitals:    09/24/21 0642 09/27/21 0628   Weight: 87.9 kg (193 lb 12.6 oz) 86 kg (189 lb 9.5 oz)     Vital Signs with Ranges  Temp:  [97.5  F (36.4  C)-98.2  F (36.8  C)] 98.2  F (36.8  C)  Pulse:  [50-61] 61  Resp:  [16] 16  BP: (105-138)/(68-91) 105/68  SpO2:  [96 %-98 %] 97 %    Constitutional: Awake, alert, cooperative, no apparent distress  Lungs: Clear to auscultation bilaterally, no crackles or wheezing  Cardiovascular: Regular rate and rhythm, normal S1 and S2, and no murmur noted  Abdomen: Normal bowel sounds, soft, non-distended, non-tender  Skin: No rashes, no cyanosis, no edema  MS: R foot in surgical dressing    Other:    Medications       acetaminophen  975 mg Oral Q8H     polyethylene glycol  17 g Oral Daily     senna-docusate  1 tablet Oral BID     sodium chloride (PF)  3 mL Intracatheter Q8H     vancomycin  1,750 mg Intravenous Q12H       Data   All microbiology laboratory data reviewed.  Recent Labs   Lab Test 09/25/21  0546 09/24/21  0928 09/23/21  1926   WBC 4.5 5.7 5.8   HGB 11.8* 12.2* 12.3*   HCT 36.2* 37.4* 37.4*   MCV 85 85 84    278 252     Recent Labs   Lab Test 09/27/21  0652 09/26/21  1227 09/25/21  0546   CR 0.86 0.71 0.73     Recent Labs   Lab Test 09/25/21  0546   SED 61*     Component      Latest Ref Rng & Units 9/24/2021   HIV Antigen Antibody Combo      Nonreactive Nonreactive   Hepatitis C Antibody      Nonreactive Nonreactive   Hep B Surface Agn      Nonreactive Nonreactive   Hepatitis B Surface Antibody      <8.00 m[IU]/mL 890.47 (H)     Micro  9/22 R foot synovial fluid aspirate -MRSA  9/23 R foot wound MRSA  Blood cx 9/23, 9/24 negative     Staphylococcus aureus MRSA     GERRY     Clindamycin <=0.25 ug/mL Susceptible 1     Erythromycin >=8.0 ug/mL Resistant     Gentamicin <=0.5 ug/mL Susceptible     Linezolid 2.0 ug/mL Susceptible     Oxacillin >=4.0 ug/mL Resistant     Tetracycline <=1.0 ug/mL Susceptible      Trimethoprim/Sulfamethoxazole <=0.5/9.5 u... Susceptible     Vancomycin 1.0 ug/mL Susceptible

## 2021-09-27 NOTE — PROGRESS NOTES
[facility]    Hospitalist Progress Note    Assessment & Plan   Zander Blunt is a 35 year old mal with PMHx of depression, anxiety and substance abuse including IV drug use who was admitted on 9/23/2021 after undergoing an emergent surgery for septic right first metatarsophalangeal joint infection with orthopedic surgery     Septic arthritis of the first metatarsophalangeal joint dt staph aureus, s/p I&D 9/23/21  * Preceding current foot infection, patient had an infection in his arm (a week prior) related to IV drug use. He was given doxycycline which he took for a week prior to presentation. He then developed his current foot/toe issues. He does not inject in his foot.   * Had been seen in ortho clinic on 9/22. At that time, ortho thought his foot pain was gout flare and had cortisone injected after aspiration. Fluid cx showed 389k WBC and staph aureus; patient was directed to come to hospital for intervention.   * Underwent I&D of right first MTP on 9/23 per ortho.  * Blood cultures remained negative this stay.  * Echo this stay: EF 60-65%, mild concentric LVH, RV normal. LV mildly dilated, no valvular vegetations seen. IVC normal in size.  * HIV neg, Hep B Ab+, hep C nonreactive.  * Initially managed with IV Ancef, changed to Vancomycin 9/25 after wound cultures were found to grow staph aureus (MRSA).    -- cont IV Vanco for now, anticipate several week course of abx  -- discharge abx plan complicated given hx of IV drug use -- not candidate for home infusion, not candidate for any TCUs; present options include remain in hospital for duration of treatment with PICC line vs OP infusion center for daily abx infusion via peripheral IV. Present options per ID are as follows:    Option 1  -If PICC is placed, he will need to be at a facility for the duration of IV antibiotics, cannot go home with PICC because he was injecting cocaine until he was hospitalized. Would suggest 2 weeks IV  vancomycin until 10/6 then transition to PO Linezolid 600 mg BID for another 2 weeks thereafter (Linezolid is now available as generic, and is inexpensive but should check costs etc prior to discharge)    Option 2  He could be transitioned to Daptomycin 6mg/kg daily infusion for 2 weeks until 10/6 then switch to oral Linezolid for another 2 weeks. Daptomycin is expensive so will need insurance check etc. He can be set up at the infusion center for daily infusions through a peripheral cathter which will need to be placed daily for infusion and then removed after each infusion. Compliance can be an issue.     -- routine postop cares per ortho including, dressing changes; WBAT with postop shoe  -- not needing anything for pain postop    Depression  Anxiety  Chronic and stable on home meds including Xanax and Trazodone  -- psych consulted on  per patient request given death of his father earlier today     Anemia, likely chronic  Hgb low but stable at 12 this stay.      Hx Substance abuse  Hx IV drug use. Reportedly treated in  per records  Met with chem dep counselor this stay. Will follow up after discharge for outpatient CD treatment.     FEN: no IVFs, lytes stable, regular diet  DVT Prophylaxis: PCDs  Code Status: Full Code    Disposition: Discharge date unclear, pending plans for abx as outlined above. Will discuss with JENNY Paez    Interval History   Seen this afternoon. Resting comfortably, though with somewhat flat affect. No specific complaints. Denies cp/sob/cough, abd pain/n/v. His father  earlier today and tells me he's numb and hasn't yet processed it.     -Data reviewed today: I reviewed all new labs and imaging results over the last 24 hours. I personally reviewed no images or EKG's today.    Physical Exam   Temp: 98.2  F (36.8  C) Temp src: Oral BP: 105/68 Pulse: 61   Resp: 16 SpO2: 97 % O2 Device: None (Room air)    Vitals:    21 0642 21 0628   Weight:  87.9 kg (193 lb 12.6 oz) 86 kg (189 lb 9.5 oz)     Vital Signs with Ranges  Temp:  [97.5  F (36.4  C)-98.2  F (36.8  C)] 98.2  F (36.8  C)  Pulse:  [50-61] 61  Resp:  [16] 16  BP: (105-138)/(68-91) 105/68  SpO2:  [96 %-98 %] 97 %  I/O last 3 completed shifts:  In: 240 [P.O.:240]  Out: -     Constitutional: Resting comfortably, alert and conversing appropriately,NAD  Respiratory: CTAB, no wheeze/rales/rhonchi, no increased work of breathing  Cardiovascular: HRRR, no MGR, no LE edema  GI: S, NT, ND, +BS  Skin/Integumen: warm/dry, R foot wrapped and not directly examined by me  Other:      Medications       acetaminophen  975 mg Oral Q8H     polyethylene glycol  17 g Oral Daily     senna-docusate  1 tablet Oral BID     sodium chloride (PF)  3 mL Intracatheter Q8H     vancomycin  1,750 mg Intravenous Q12H       Data   Recent Labs   Lab 09/27/21  0652 09/26/21  1227 09/25/21  0546 09/24/21  0952 09/24/21  0928 09/24/21  0928 09/23/21  1926 09/23/21 1926   WBC  --   --  4.5  --   --  5.7  --  5.8   HGB  --   --  11.8*  --   --  12.2*  --  12.3*   MCV  --   --  85  --   --  85  --  84   PLT  --   --  279  --   --  278  --  252   NA  --   --   --   --   --  140  --  137   POTASSIUM  --   --   --   --   --  4.2  --  4.1   CHLORIDE  --   --   --   --   --  107  --  105   CO2  --   --   --   --   --  28  --  30   BUN  --   --   --   --   --  11  --  11   CR 0.86 0.71 0.73  --    < > 0.74   < > 0.78   ANIONGAP  --   --   --   --   --  5  --  2*   CAIT  --   --   --   --   --  8.5  --  9.1   GLC  --   --  91 116*  --  122*   < > 115*    < > = values in this interval not displayed.       No results found for this or any previous visit (from the past 24 hour(s)).

## 2021-09-27 NOTE — PLAN OF CARE
Orientation: A & O x 4. Contact precautions for MRSA  VS/ O2/ IV: VSS on RA. IV SL. Vanco x 1.   Mobility: Independent w/ in room, WBAT to RLE. Ortho shoe in room.  Pain Management: Denies  Diet: Regular,adequate intake.   Bowel/ Bladder: Continent of bowel and bladder.   Skin: ACE wrap RLE c/d/I.   CMS: Intact.   Discharge/ Plan:  Needs to be on IV abx. Pending placement.

## 2021-09-27 NOTE — PROGRESS NOTES
Orthopedic Surgery  Zander Blunt  2021  Admit Date:  2021  POD: 4 Days Post-Op   Procedure(s):  IRRIGATION AND DEBRIDEMENT, RIGHT FIRST METATARSAL PHALANGEAL JOINT    Alert and oriented to person, place, and time.  Patient resting comfortably in bed.    Pain controlled in toe.  Tolerating oral intake.    Denies nausea or vomiting  Denies chest pain or shortness of breath    Vital Sign Ranges  Temperature Temp  Av  F (36.7  C)  Min: 97.5  F (36.4  C)  Max: 98.2  F (36.8  C)   Blood pressure Systolic (24hrs), Av , Min:105 , Max:138        Diastolic (24hrs), Av, Min:68, Max:91      Pulse Pulse  Av.3  Min: 50  Max: 61   Respirations Resp  Av  Min: 16  Max: 16   Pulse oximetry SpO2  Av %  Min: 96 %  Max: 98 %       Dressing is clean, dry, and intact.   No erythema of the surrounding skin along distal toe.    Able to flex and extend the right great toe with minimal pain.    Bilateral calves are soft, non-tender.  Right lower extremity is NVI.  Sensation intact bilateral lower extremities  Patient able to resist dorsi and plantar flexion bilaterally  +Dp pulse    Labs:  Recent Labs   Lab Test 21  0928 21  1810   POTASSIUM 4.2 4.1 4.4     Recent Labs   Lab Test 21  0546 21  0928 21   HGB 11.8* 12.2* 12.3*     No results for input(s): INR in the last 37646 hours.  Recent Labs   Lab Test 21  0546 21  0928 21    278 252     WBC   Date Value Ref Range Status   2014 4.0 4.0 - 11.0 10e9/L Final     WBC Count   Date Value Ref Range Status   2021 4.5 4.0 - 11.0 10e3/uL Final         A/P  1. S/p 1st toe MTP I&D (septic native joint)              Continue amb and SCDs for DVT prophylaxis.                Mobilize with PT/OT               WBAT with post-op shoe              Leave dressings intact              IV abx per ID.                Continue current pain regimen.     2. Disposition               Anticipate d/c to home based on abx plan. Ortho stable    Ebony Hernández, PA-C

## 2021-09-28 LAB
BACTERIA BLD CULT: NO GROWTH
BACTERIA BLD CULT: NO GROWTH
CREAT SERPL-MCNC: 0.91 MG/DL (ref 0.66–1.25)
GFR SERPL CREATININE-BSD FRML MDRD: >90 ML/MIN/1.73M2

## 2021-09-28 PROCEDURE — 120N000001 HC R&B MED SURG/OB

## 2021-09-28 PROCEDURE — 99222 1ST HOSP IP/OBS MODERATE 55: CPT | Performed by: PSYCHIATRY & NEUROLOGY

## 2021-09-28 PROCEDURE — 99207 PR CONSULT E&M CHANGED TO INITIAL LEVEL: CPT | Performed by: PSYCHIATRY & NEUROLOGY

## 2021-09-28 PROCEDURE — 250N000011 HC RX IP 250 OP 636: Performed by: INTERNAL MEDICINE

## 2021-09-28 PROCEDURE — 99232 SBSQ HOSP IP/OBS MODERATE 35: CPT | Performed by: INTERNAL MEDICINE

## 2021-09-28 PROCEDURE — 36415 COLL VENOUS BLD VENIPUNCTURE: CPT | Performed by: INTERNAL MEDICINE

## 2021-09-28 PROCEDURE — 258N000003 HC RX IP 258 OP 636: Performed by: INTERNAL MEDICINE

## 2021-09-28 PROCEDURE — 250N000013 HC RX MED GY IP 250 OP 250 PS 637: Performed by: INTERNAL MEDICINE

## 2021-09-28 PROCEDURE — 82565 ASSAY OF CREATININE: CPT | Performed by: INTERNAL MEDICINE

## 2021-09-28 RX ADMIN — VANCOMYCIN HYDROCHLORIDE 1750 MG: 1 INJECTION, POWDER, LYOPHILIZED, FOR SOLUTION INTRAVENOUS at 20:05

## 2021-09-28 RX ADMIN — ALPRAZOLAM 1 MG: 1 TABLET ORAL at 20:18

## 2021-09-28 RX ADMIN — VANCOMYCIN HYDROCHLORIDE 1750 MG: 1 INJECTION, POWDER, LYOPHILIZED, FOR SOLUTION INTRAVENOUS at 06:03

## 2021-09-28 ASSESSMENT — ACTIVITIES OF DAILY LIVING (ADL)
ADLS_ACUITY_SCORE: 5

## 2021-09-28 NOTE — PROGRESS NOTES
[facility]    Infectious Disease Progress Note    Date of Service : 09/28/2021       Assessment:  1. S.aureus (MRSA) septic arthritis r 1st MTP joint , likely related to hematogenous seeding -substance abuse history is the risk factor for infection. Blood cxs negative thus far. No evidence of endocarditis  2. Chronic anemia  3. Chronic medical conditions - ADHD, history of right leg cellulitis, chemical dependency, depression, hypertension, panic disorder    Recommendations:  1. Continue Vancomycin, level subtherapeutic. Dose adjustment per Pharm-D  2. Discussed treatment options with patient. He prefers to stay here for 2 weeks for IV vancomycin until 10/10  then transition to oral Linezolid for another 2 weeks  3. Check safety labs - CBC/diff weekly, creatinine and vancomycin levels twice weekly    Follow clinical status  Danay Campbell MD    Interval History   Resting, no new complaints, tolerating antibiotics without side effects.    Antimicrobial therapy  9/5 Vancomycin     Physical Exam   Temp: 97.2  F (36.2  C) Temp src: Oral BP: 122/65 Pulse: 59   Resp: 14 SpO2: 98 % O2 Device: None (Room air)    Vitals:    09/24/21 0642 09/27/21 0628 09/28/21 0627   Weight: 87.9 kg (193 lb 12.6 oz) 86 kg (189 lb 9.5 oz) 86 kg (189 lb 9.6 oz)     Vital Signs with Ranges  Temp:  [97.2  F (36.2  C)-98.4  F (36.9  C)] 97.2  F (36.2  C)  Pulse:  [59-62] 59  Resp:  [14] 14  BP: (115-129)/(65-77) 122/65  SpO2:  [97 %-98 %] 98 %    Constitutional: Awake, alert, cooperative, no apparent distress  Lungs: Clear to auscultation bilaterally, no crackles or wheezing  Cardiovascular: Regular rate and rhythm, normal S1 and S2, and no murmur noted  Abdomen: Normal bowel sounds, soft, non-distended, non-tender  Skin: No rashes, no cyanosis, no edema  MS: R foot in surgical dressing  Other:    Medications       acetaminophen  975 mg Oral Q8H     polyethylene glycol  17 g Oral Daily     senna-docusate  1 tablet Oral BID      sodium chloride (PF)  3 mL Intracatheter Q8H     vancomycin  1,750 mg Intravenous Q12H       Data   All microbiology laboratory data reviewed.  Recent Labs   Lab Test 09/25/21  0546 09/24/21  0928 09/23/21  1926   WBC 4.5 5.7 5.8   HGB 11.8* 12.2* 12.3*   HCT 36.2* 37.4* 37.4*   MCV 85 85 84    278 252     Recent Labs   Lab Test 09/28/21  0702 09/27/21  0652 09/26/21  1227   CR 0.91 0.86 0.71     Recent Labs   Lab Test 09/25/21  0546   SED 61*     Component      Latest Ref Rng & Units 9/24/2021   HIV Antigen Antibody Combo      Nonreactive Nonreactive   Hepatitis C Antibody      Nonreactive Nonreactive   Hep B Surface Agn      Nonreactive Nonreactive   Hepatitis B Surface Antibody      <8.00 m[IU]/mL 890.47 (H)      Component      Latest Ref Rng & Units 9/26/2021   Vancomycin Level      mg/L 7.0     Micro  9/22 R foot synovial fluid aspirate -MRSA  9/23 R foot wound MRSA  Blood cx 9/23, 9/24 negative              Staphylococcus aureus MRSA       GERRY       Clindamycin <=0.25 ug/mL Susceptible 1       Erythromycin >=8.0 ug/mL Resistant       Gentamicin <=0.5 ug/mL Susceptible       Linezolid 2.0 ug/mL Susceptible       Oxacillin >=4.0 ug/mL Resistant       Tetracycline <=1.0 ug/mL Susceptible       Trimethoprim/Sulfamethoxazole <=0.5/9.5 u... Susceptible       Vancomycin 1.0 ug/mL Susceptible

## 2021-09-28 NOTE — PLAN OF CARE
Contact for MRSA. A&Ox4. Flat affect. VSS on RA. Denies pain. Independent in room. WBAT with post op ortho shoe. Voiding spontaneously. CMS intact. Dressing CDI. IV antibiotics q 12 hrs for infection. Discharge pending antibiotic plan. ID, hospitalist, orthosurgery following. Discharge pending.

## 2021-09-28 NOTE — PROGRESS NOTES
SPIRITUAL HEALTH SERVICES  SPIRITUAL ASSESSMENT Progress Note  [facility] UN[facility]    REFERRAL SOURCE: -initiated visited to assess emotional and spiritual needs in light of length of hospital stay; chart review      Attempted visit; patient was engaged in in-depth conversation on phone.    PLAN: I will re-attempt tomorrow. Spiritual Health Services remains available for patient, family, and staff support.     Please page the on call  by placing a STAT or ASAP Epic referral for Spiritual Health (which will roll to the on call pager).        LEANDRO Veloz.   Associate   Pager [phone]

## 2021-09-28 NOTE — PLAN OF CARE
Staph aureus infection of the R big toe. I&D on 9/23. AOx4. VSS on RA. Ind. Ortho boot for ambulation - ace bandage wrap. RDA. PIV SL. Denies pain. Refused tylenol and stool softener/laxative. +flatus +BS -BM. ID following. Pending psych consult. discharge pending abx course of action.

## 2021-09-28 NOTE — PLAN OF CARE
VSS on RA, Alert and Oriented.  Independent in room, Contact precautions for MRSA.  Regular diet.  When OOB use ortho boot to protect R great toe.  Infectious disease following.  Hx of IV drug abuse so discharge is pending based on plan for antibiotics.  Not able to discharge with a PIV.  PIV SL

## 2021-09-28 NOTE — PROGRESS NOTES
[facility]    Hospitalist Progress Note    Assessment & Plan   Zander Blunt is a 35 year old mal with PMHx of depression, anxiety and substance abuse including IV drug use who was admitted on 9/23/2021 after undergoing an emergent surgery for septic right first metatarsophalangeal joint infection with orthopedic surgery     Septic arthritis of the first metatarsophalangeal joint dt staph aureus, s/p I&D 9/23/21  * Preceding current foot infection, patient had an infection in his arm (a week prior) related to IV drug use. He was given doxycycline which he took for a week prior to presentation. He then developed his current foot/toe issues. He does not inject in his foot.   * Had been seen in ortho clinic on 9/22. At that time, ortho thought his foot pain was gout flare and had cortisone injected after aspiration. Fluid cx showed 389k WBC and staph aureus; patient was directed to come to hospital for intervention.   * Underwent I&D of right first MTP on 9/23 per ortho.  * Blood cultures remained negative this stay.  * Echo this stay: EF 60-65%, mild concentric LVH, RV normal. LV mildly dilated, no valvular vegetations seen. IVC normal in size.  * HIV neg, Hep B Ab+, hep C nonreactive.  * Initially managed with IV Ancef, changed to Vancomycin 9/25 after wound cultures were found to grow staph aureus (MRSA).    -- cont IV Vanco for now, anticipate several week course of abx  -- discharge abx plan complicated given hx of IV drug use -- not candidate for home infusion, not candidate for any TCUs; present options include remain in hospital for duration of treatment with PICC line vs OP infusion center for daily abx infusion via peripheral IV. Present options per ID are as follows:    Option 1  -If PICC is placed, he will need to be at a facility for the duration of IV antibiotics, cannot go home with PICC because he was injecting cocaine until he was hospitalized. Would suggest 2 weeks IV  vancomycin until 10/6 then transition to PO Linezolid 600 mg BID for another 2 weeks thereafter (Linezolid is now available as generic, and is inexpensive but should check costs etc prior to discharge)    Option 2  He could be transitioned to Daptomycin 6mg/kg daily infusion for 2 weeks until 10/6 then switch to oral Linezolid for another 2 weeks. Daptomycin is expensive so will need insurance check etc. He can be set up at the infusion center for daily infusions through a peripheral cathter which will need to be placed daily for infusion and then removed after each infusion. Compliance can be an issue.     -- pharm liaison has noted linezolid will be covered, care coordinator looking into daptomycin coverage  -- routine postop cares per ortho including, dressing changes; WBAT with postop shoe  -- not needing anything for pain postop    Depression  Anxiety  Chronic and stable on home meds including Xanax and Trazodone  -- psych consulted per patient request given death of his father on 9/27, await recommendations     Anemia, likely chronic  Hgb low but stable at 12 this stay.      Hx Substance abuse  Hx IV drug use. Reportedly treated in 2006/2007 per records  Met with chem dep counselor this stay. Will follow up after discharge for outpatient CD treatment.     FEN: no IVFs, lytes stable, regular diet  DVT Prophylaxis: PCDs  Code Status: Full Code    Disposition: Discharge date unclear, pending plans for abx as outlined above. Will discuss with JENNY Paez    Interval History   Seen this morning. Flat affect, remained under covers for the duration of my visit. Says he talked with psych this morning, patient unable to recall recommendations. Denies complaints otherwise, no cp/sob/cough, abd pain/n/v.     -Data reviewed today: I reviewed all new labs and imaging results over the last 24 hours. I personally reviewed no images or EKG's today.    Physical Exam   Temp: 97.2  F (36.2  C) Temp src: Oral  BP: 122/65 Pulse: 59   Resp: 14 SpO2: 98 % O2 Device: None (Room air)    Vitals:    09/24/21 0642 09/27/21 0628 09/28/21 0627   Weight: 87.9 kg (193 lb 12.6 oz) 86 kg (189 lb 9.5 oz) 86 kg (189 lb 9.6 oz)     Vital Signs with Ranges  Temp:  [97.2  F (36.2  C)-98.4  F (36.9  C)] 97.2  F (36.2  C)  Pulse:  [59-62] 59  Resp:  [14] 14  BP: (115-129)/(65-77) 122/65  SpO2:  [97 %-98 %] 98 %  No intake/output data recorded.    Constitutional: Resting quietly, more flat affect today, NAD  Respiratory: CTAB, no wheeze/rales/rhonchi, no increased work of breathing  Cardiovascular: HRRR, no MGR, no LE edema  GI: S, NT, ND, +BS  Skin/Integumen: warm/dry, R foot wrapped and not directly examined by me  Other:      Medications       acetaminophen  975 mg Oral Q8H     polyethylene glycol  17 g Oral Daily     senna-docusate  1 tablet Oral BID     sodium chloride (PF)  3 mL Intracatheter Q8H     vancomycin  1,750 mg Intravenous Q12H       Data   Recent Labs   Lab 09/28/21  0702 09/27/21  0652 09/26/21  1227 09/25/21  0546 09/25/21  0546 09/24/21  0952 09/24/21  0928 09/24/21  0928 09/23/21 1926 09/23/21 1926   WBC  --   --   --   --  4.5  --   --  5.7  --  5.8   HGB  --   --   --   --  11.8*  --   --  12.2*  --  12.3*   MCV  --   --   --   --  85  --   --  85  --  84   PLT  --   --   --   --  279  --   --  278  --  252   NA  --   --   --   --   --   --   --  140  --  137   POTASSIUM  --   --   --   --   --   --   --  4.2  --  4.1   CHLORIDE  --   --   --   --   --   --   --  107  --  105   CO2  --   --   --   --   --   --   --  28  --  30   BUN  --   --   --   --   --   --   --  11  --  11   CR 0.91 0.86 0.71   < > 0.73  --    < > 0.74   < > 0.78   ANIONGAP  --   --   --   --   --   --   --  5  --  2*   CAIT  --   --   --   --   --   --   --  8.5  --  9.1   GLC  --   --   --   --  91 116*  --  122*   < > 115*    < > = values in this interval not displayed.       No results found for this or any previous visit (from the past 24  hour(s)).

## 2021-09-28 NOTE — PROGRESS NOTES
Care Management Follow Up    Length of Stay (days): 5    Expected Discharge Date: 10/11/2021     Concerns to be Addressed: homelessness, mental health, substance/tobacco abuse/use     Patient plan of care discussed at interdisciplinary rounds: Yes    Anticipated Discharge Disposition: Other (Comments)     Anticipated Discharge Services:  Poss outpatient Infusion  Anticipated Discharge DME:      Patient/family educated on Medicare website which has current facility and service quality ratings: no  Education Provided on the Discharge Plan:    Patient/Family in Agreement with the Plan:      Referrals Placed by CM/SW:    Private pay costs discussed: Not applicable    Additional Information:    Spoke earlier today with [facility] pharmacy liaison to check Daptomycin costs.   Patient has VIRID[employer] MA product. Daptomycin is not on the review list for outpatient infusion, therefore should be covered. Only Commercial insurance plans are required to review this medication for authorization    Addendum 1500: spoke with patient re: possibility of daily infusions at Outpatient center. He recently spoke w/ JUSTIN KHAN and the current plan is to complete the Vanco course here in the hospital.  CC will continue to follow for any change in discharge plan

## 2021-09-29 LAB
BACTERIA BLD CULT: NO GROWTH
BACTERIA BLD CULT: NO GROWTH
CREAT SERPL-MCNC: 0.88 MG/DL (ref 0.66–1.25)
GFR SERPL CREATININE-BSD FRML MDRD: >90 ML/MIN/1.73M2
VANCOMYCIN SERPL-MCNC: 9.4 MG/L

## 2021-09-29 PROCEDURE — 36415 COLL VENOUS BLD VENIPUNCTURE: CPT | Performed by: INTERNAL MEDICINE

## 2021-09-29 PROCEDURE — 99232 SBSQ HOSP IP/OBS MODERATE 35: CPT | Performed by: PSYCHIATRY & NEUROLOGY

## 2021-09-29 PROCEDURE — 250N000013 HC RX MED GY IP 250 OP 250 PS 637: Performed by: ORTHOPAEDIC SURGERY

## 2021-09-29 PROCEDURE — 99232 SBSQ HOSP IP/OBS MODERATE 35: CPT | Performed by: INTERNAL MEDICINE

## 2021-09-29 PROCEDURE — 250N000013 HC RX MED GY IP 250 OP 250 PS 637: Performed by: PSYCHIATRY & NEUROLOGY

## 2021-09-29 PROCEDURE — 82565 ASSAY OF CREATININE: CPT | Performed by: INTERNAL MEDICINE

## 2021-09-29 PROCEDURE — 80202 ASSAY OF VANCOMYCIN: CPT | Performed by: INTERNAL MEDICINE

## 2021-09-29 PROCEDURE — 258N000003 HC RX IP 258 OP 636: Performed by: INTERNAL MEDICINE

## 2021-09-29 PROCEDURE — 120N000001 HC R&B MED SURG/OB

## 2021-09-29 PROCEDURE — 250N000011 HC RX IP 250 OP 636: Performed by: INTERNAL MEDICINE

## 2021-09-29 RX ORDER — HYDROXYZINE HYDROCHLORIDE 25 MG/1
25 TABLET, FILM COATED ORAL 3 TIMES DAILY PRN
Status: DISCONTINUED | OUTPATIENT
Start: 2021-09-29 | End: 2021-10-10 | Stop reason: HOSPADM

## 2021-09-29 RX ORDER — MIRTAZAPINE 15 MG/1
15 TABLET, FILM COATED ORAL AT BEDTIME
Status: DISCONTINUED | OUTPATIENT
Start: 2021-09-29 | End: 2021-10-10 | Stop reason: HOSPADM

## 2021-09-29 RX ORDER — PHENOBARBITAL 32.4 MG/1
32.4 TABLET ORAL
Status: COMPLETED | OUTPATIENT
Start: 2021-10-04 | End: 2021-10-04

## 2021-09-29 RX ORDER — PHENOBARBITAL 32.4 MG/1
32.4 TABLET ORAL 3 TIMES DAILY
Status: COMPLETED | OUTPATIENT
Start: 2021-09-29 | End: 2021-10-01

## 2021-09-29 RX ORDER — PHENOBARBITAL 32.4 MG/1
32.4 TABLET ORAL 2 TIMES DAILY
Status: COMPLETED | OUTPATIENT
Start: 2021-10-02 | End: 2021-10-03

## 2021-09-29 RX ADMIN — VANCOMYCIN HYDROCHLORIDE 1750 MG: 1 INJECTION, POWDER, LYOPHILIZED, FOR SOLUTION INTRAVENOUS at 08:51

## 2021-09-29 RX ADMIN — PHENOBARBITAL 32.4 MG: 32.4 TABLET ORAL at 15:54

## 2021-09-29 RX ADMIN — PHENOBARBITAL 32.4 MG: 32.4 TABLET ORAL at 11:58

## 2021-09-29 RX ADMIN — VANCOMYCIN HYDROCHLORIDE 1750 MG: 1 INJECTION, POWDER, LYOPHILIZED, FOR SOLUTION INTRAVENOUS at 21:45

## 2021-09-29 RX ADMIN — SENNOSIDES AND DOCUSATE SODIUM 1 TABLET: 8.6; 5 TABLET ORAL at 22:07

## 2021-09-29 RX ADMIN — MIRTAZAPINE 15 MG: 15 TABLET, FILM COATED ORAL at 21:45

## 2021-09-29 RX ADMIN — PHENOBARBITAL 32.4 MG: 32.4 TABLET ORAL at 21:45

## 2021-09-29 ASSESSMENT — ACTIVITIES OF DAILY LIVING (ADL)
ADLS_ACUITY_SCORE: 5

## 2021-09-29 NOTE — CONSULTS
Consult Date: 2021    REASON FOR CONSULTATION:  The patient's father  today, asking to speak with Psychiatry, history of homelessness and substance use.    REFERRING PHYSICIAN:  Dr. Jeannine DO.    HISTORY OF PRESENT ILLNESS:  Mr. Zander Blunt is a pleasant 35-year-old gentleman with a history of ADHD, adjustment disorder, anxiety, right leg cellulitis, chemical dependency, depression and hypertension and panic disorder, presented to [facility] after undergoing emergency surgery for septic right first metatarsophalangeal joint infection.  The patient is currently convalescing on station 55.  It was noted the patient's father recently passed away, and he was requesting speak to Psychiatry in the setting of some complicated previous psychiatric and chemical dependency history.    The patient reports that his father passed away from a stroke suddenly.  He was close to his father and is experiencing grief.  Describes that this also in a setting of a history of depression.  He has had very substantial depression recently in the past several months regardless.  He states that this occurred after experiencing another relapse on cocaine use.  Describes that he has been quite frustrated with his recent relapse and difficulty avoiding further cocaine use.  He describes that depression causes sleep disturbance, reduction in appetite and low motivation and energy.  He does deny any hopelessness or any suicidal thoughts.  Occasionally he has some passive thoughts about death but denies any intent or planning.  He does feel like he can stay safe at this time.  The patient also has been willing to confirm a crisis plan that he would use 911 or emergency room if he had a crisis.  The patient denies any history of khai phase symptoms on broad review.  He does report some occasional hallucinations, only when he is intoxicated with stimulants, however.  Reports that he uses cocaine almost exclusively in  terms of substance use.  He did use methamphetamine, but not for several years.  Denies any other drug use.  He denies any problematic alcohol use.  The patient reports that he has been in treatment several times.  He does believe it would be helpful for him to get back into a sober living environment, although is not believing that he can go into residential inpatient treatment, because he needs to pay his child support and thus needs to continue to work.  The patient does report success with previous treatments and was able to maintain sobriety for around 5 years or longer when he was a bit younger.  He does have substantial stressors, including relationship stressors with his significant other, who has given him an ultimatum that he needs to stay sober with the relationship on a break.  The patient works doing renovations and describes that he actually has good performance with his work.  The patient denies any history of prior OCD, eating disorder, does note a history of some previous traumatic experiences including a friend of his passed away when they actually overdosed together.  He also lost a sister to sudden infant death syndrome when the patient was 5.  He does report some PTSD symptoms at times like intrusive recollections that are hard to avoid with certain triggers, occasional flashback experiences and occasional nightmares.    PAST PSYCHIATRIC HISTORY:  Denies prior suicide attempts.  Denies prior psychiatric admissions.  He has been in treatment several times.  Prior diagnoses in the chart include depression, ADHD, adjustment disorder.  He does report he has been on alprazolam about twice a day for panic attacks for the past several years, also is prescribed trazodone for sleep.  Reports he did try an SSRI in the past, but it gave quite unpleasant sexual side effects, so he is wanting to avoid going back to an SSRI.    REVIEW OF SYSTEMS:  A 10-point review of systems was completed and negative other  than described in HPI.    PAST MEDICAL HISTORY:  Right leg cellulitis, hemorrhoids, hypertension, iron deficiency anemia, rhabdomyolysis.    PAST SURGICAL HISTORY:  Reviewed from the EMR.    FAMILY HISTORY:  Hypertension and stroke.    SOCIAL HISTORY:  The patient was previously  and .  He does have multiple children.  He describes that his previous marriage and having children was a way to help him stay sober in the past.  Unfortunately, that marriage ended in divorce.  He does have to pay substantial child support, stating that he needs to keep working doing home renovations to keep up with the child support that is required.    ALLERGIES:  REVIEWED FROM THE EMR.    CURRENT MEDICATIONS:    1.  Albuterol.  2.  Alprazolam.  3.  Trazodone.  4.  Doxycycline.    REVIEW OF SYSTEMS:  A 10-point review of systems completed and negative other than described in HPI.    PHYSICAL EXAMINATION:    VITAL SIGNS:  Blood pressure 115/67, temperature 98.2, pulse 63, respiratory rate 15, SpO2 of 100% on room air.    MENTAL STATUS EXAMINATION:  He is dressed in a hospital gown, lying down in bed, yawning substantially throughout the interview.  He often covers himself up, and he is appearing quite tired.  Speech is nonpressured, normal volume and prosody.  Affect is calm and flat to some extent, although improves with development of rapport and was more reactive by the end of the encounter as he was more awake.  Thought form linear, logical, goal-directed.  No flight of ideas.  Not responding to internal stimuli.  Denies perceptual disturbances.  No homicidal or suicidal ideation.  No fluctuation in cognition or deficits in cognitive processing.  Short and long-term memory intact.  Intelligence estimated to be average to above average based on conversation and accomplishments.  Insight and judgment certainly reduced in the setting of his chemical dependency, although it is improving here in the hospital setting, as he  is seemingly showing an effort to be cooperative and collaborative and accepting medical and psychiatric interventions.    IMPRESSION, REPORT, PLAN:    1.  Major depressive disorder, moderate, recurrent, without psychotic features.  2.  Generalized anxiety disorder with panic attacks.  3.  Attention deficit hyperactivity disorder by history.  4.  Cocaine use disorder, severe.  5.  Septic metatarsophalangeal joint, status post debridement.    RISK LEVEL:  Risk of intentional harm to self or others is assessed to be low, as he is without suicidal thoughts or thoughts of harming others, denies a history of violent behavior toward self or others, he is without agitation or aggressive behavior, he is future-oriented, he is seeking help, and he is fully cooperative and collaborative at this time.  Access to firearms not reviewed given low risk assessments.    RESULTS REVIEW:  Reviewed.  Creatinine 0.91.  CBC shows WBC 4.5, hematocrit 36.2, hemoglobin 11.8.    PLAN:    1.  The patient would benefit from engaging in chemical dependency treatment.  He prefers a sober housing arrangement with intensive outpatient program options if possible, as the patient states that he essentially has to work to maintain required child support payments.  He will otherwise certainly most benefit from inpatient treatment program.  2.  At this time, we can continue trazodone and alprazolam, although I certainly reviewed the concerns about alprazolam with his history of addiction to other substances, especially cocaine.  Discussed possibly tapering off alprazolam or utilizing a phenobarbital to assist with going off alprazolam.  The patient is somewhat open to this but also a bit frustrated that most treatment programs would insist that he discontinue alprazolam, as he states he has never abused it and believes that it is therapeutic for his panic disorder.  We could consider starting mirtazapine.  If patient is assessed to be medically stable  enough to start a new antidepressant, as the patient does report likely tried this in the past and believes that it might have been beneficial, and the patient experienced unpleasant sexual side effects increased SSRIs.  3.  Reconsult Psychiatry on Wednesday for followup, so I can discuss hopefully with Dr. Washington, given some of the case complexity here and some of the unique situations regarding the patient and his ability to attend treatment with his situation with his child support payments and so forth.    Lucas Zeng MD        D: 2021   T: 2021   MT: SHANON    Name:     MELLY CHRISTIAN  MRN:      [id number]        Account:      [id number]   :      1985           Consult Date: 2021     Document: [id number]

## 2021-09-29 NOTE — PROGRESS NOTES
Care Management Follow Up    Length of Stay (days): 6    Expected Discharge Date: 10/11/2021     Concerns to be Addressed: homelessness, mental health, substance/tobacco abuse/use     Patient plan of care discussed at interdisciplinary rounds: Yes    Anticipated Discharge Disposition: Other (Comments)     Anticipated Discharge Services:    Anticipated Discharge DME:      Patient/family educated on Medicare website which has current facility and service quality ratings: no  Education Provided on the Discharge Plan:    Patient/Family in Agreement with the Plan:      Referrals Placed by CM/SW:    Private pay costs discussed: Not applicable    Additional Information:  Call to [facility] liaison regarding referral to [facility]. Writer informed that due to patient being an IV drug user and needing IV antibiotics, they have declined him.     Call to  TCU regarding referral. At this time, they do not anticipate having an open bed for the next few weeks. If a bed opens up prior to they will update writer.      MALLORIE Ashley

## 2021-09-29 NOTE — PLAN OF CARE
Pt Aox4- flat, VSS on RA, CMS intact, IND in room. Pt denied any pain this shift- refused scheduled tylenol this shift. Tolerating regular diet, adequate fluid intake. Voiding w/o difficulty, + flatus, + BS, - BM this shift. R PIV SL w/ int Vanco. Pt will be on Vanco until 10/10 then will transition to PO ABX at discharge. Psych is following. Changed xanax to luminol this AM- pt is hesitant w/ this change. Ortho is following. Chem dept consulted. Plan to receive IV ABX in hospital until 10/10 then will transition w/ PO ABX. Discharge location TBD.

## 2021-09-29 NOTE — CONSULTS
[facility] Psychiatric Consult Progress Note    Interval History:   Pt seen, chart reviewed, case discussed with nursing staff and treating clinicians.  I met with the patient this morning to discuss his situation, reviewed Dr. Zeng's note.  Given the concerns about ongoing cocaine use, continuing Xanax is really not a sustainable treatment plan.  I had a long discussion with the patient about this, reviewed the recommendation for a phenobarbital taper which he currently agrees to.  We discussed starting Remeron to assist with anxiety and sleep, noting that this does not typically cause sexual dysfunction which he has historically been concerned about.  He was a bit groggy with me, but did cooperate enough to have this discussion.  At this juncture it is not clear how much longer he will need to be in the hospital, he should be seen by chemical dependency.  He apparently sees a psychiatrist by the name of Bayron Griffith through [facility], I am quite certain Dr. Griffith is not aware of the ongoing chemical use concern.  He says he has been taking Xanax roughly 2 years, 1 mg twice daily.  I wrote for a phenobarbital taper which will take roughly 6 days.     Review of systems:   10 point Review of Systems completed by Dr. Washington, and is  is negative other than noted in the HPI     Medications:       acetaminophen  975 mg Oral Q8H     polyethylene glycol  17 g Oral Daily     senna-docusate  1 tablet Oral BID     sodium chloride (PF)  3 mL Intracatheter Q8H     vancomycin  1,750 mg Intravenous Q12H     acetaminophen, albuterol, ALPRAZolam, sore throat lozenge, bisacodyl, diphenhydrAMINE, ibuprofen, lidocaine 4%, lidocaine (buffered or not buffered), magnesium hydroxide, melatonin, methocarbamol, naloxone **OR** naloxone **OR** naloxone **OR** naloxone, ondansetron **OR** ondansetron, prochlorperazine **OR** prochlorperazine, sodium chloride (PF), traZODone    Mental Status  Examination:     Appearance:  appeared as age stated, poorly groomed and fatigued  Eye Contact:  poor   Speech:  clear, coherent and decreased prosody  Language:Normal  Psychomotor Behavior:  no evidence of tardive dyskinesia, dystonia, or tics  Mood:  good  Affect:  intensity is flat  Thought Process:  logical, linear and goal oriented no loose associations  Thought Content:  no evidence of suicidal ideation or homicidal ideation and no evidence of psychotic thought  Oriented to:  time, person, and place  Attention Span and Concentration:  fair  Recent and Remote Memory:  intact  Fund of Knowledge: appropriate  Muscle Strength and Tone: normal  Gait and Station: Normal  Insight:  fair  Judgment:  fair        Labs/Vitals:     Recent Results (from the past 24 hour(s))   Creatinine    Collection Time: 09/28/21  7:02 AM   Result Value Ref Range    Creatinine 0.91 0.66 - 1.25 mg/dL    GFR Estimate >90 >60 mL/min/1.73m2     B/P: 115/67, T: 98.2, P: 63, R: 15    Impression:   The patient is a 35-year-old gentleman with a historical diagnosis of anxiety, ADHD and substance use.  Phenobarbital taper is appropriate under the current circumstances.  I ordered a chemical dependency assessment and wrote for the phenobarbital taper.  Transitioning to chemical dependency treatment would be appropriate, it really depends on the trajectory of his medical situation, if he needs IV antibiotics given his unsettled living situation he may need a transitional care unit.  [facility] does have a detox unit that may be able to accommodate him but he would need to be off IV treatment, ambulating.      DIagnoses:   1.  Major depressive disorder recurrent, moderate severity  2.  Generalized anxiety disorder with panic attacks  3.  Cocaine use disorder  4.  Amphetamine use disorder  5.  ADHD  6.  Septic metatarsa phalangeal joint, status post debridement         Plan:   1. Written information given on medications. Side effects, risks,  benefits reviewed.  2.  Continue medical management as you are  3.  Discontinue all benzodiazepines, initiate phenobarbital taper at 32.4 mg 3 times daily for 3 days, 32.4 mg twice daily for 1 day, 32.4 milligrams for 1 day, then discontinue  4.  I ordered a chemical dependency assessment, disposition really depends on the trajectory of his medical needs, continued need for IV antibiotics.      Attestation:  Patient has been seen and evaluated by me,  Wale Washington MD

## 2021-09-29 NOTE — PROGRESS NOTES
[facility]    Hospitalist Progress Note    Assessment & Plan   Zander Blunt is a 35 year old mal with PMHx of depression, anxiety and substance abuse including IV drug use who was admitted on 9/23/2021 after undergoing an emergent surgery for septic right first metatarsophalangeal joint infection with orthopedic surgery     Septic arthritis of the first metatarsophalangeal joint dt staph aureus, s/p I&D 9/23/21  * Preceding current foot infection, patient had an infection in his arm (a week prior) related to IV drug use. He was given doxycycline which he took for a week prior to presentation. He then developed his current foot/toe issues. He does not inject in his foot.   * Had been seen in ortho clinic on 9/22. At that time, ortho thought his foot pain was gout flare and had cortisone injected after aspiration. Fluid cx showed 389k WBC and staph aureus; patient was directed to come to hospital for intervention.   * Underwent I&D of right first MTP on 9/23 per ortho.  * Blood cultures remained negative this stay.  * Echo this stay: EF 60-65%, mild concentric LVH, RV normal. LV mildly dilated, no valvular vegetations seen. IVC normal in size.  * HIV neg, Hep B Ab+, hep C nonreactive.  * Initially managed with IV Ancef, changed to Vancomycin 9/25 after wound cultures were found to grow staph aureus (MRSA).   * Discharge abx plan complicated given hx of IV drug use -- not candidate for home infusion, not candidate for any TCUs  -- ID now planning to complete 2wk course of IV Vanco through 10/10, then transition to linezolid po x2 wks (covered by insurance)  -- will remain in hospital to complete course of Vanco  -- routine postop cares per ortho including, dressing changes; WBAT with postop shoe  -- not needing anything for pain postop    Depression  Anxiety  * Chronic and stable on home meds including Xanax and Trazodone  * Psych consulted per patient request given death of his father on  9/27  * Placed on phenobarb taper and benzos dc'd per Dr. Washington on 9/29  -- consider psych reconsult as needed     Anemia, likely chronic  Hgb low but stable at 12 this stay.      Hx Substance abuse  Hx IV drug use. Reportedly treated in 2006/2007 per records  Met with chem dep counselor earlier this stay. Will follow up after discharge for outpatient CD treatment.     FEN: no IVFs, lytes stable, regular diet  DVT Prophylaxis: PCDs  Code Status: Full Code    Disposition: Will remain hospitalized to complete course of Vanco (will cont through 10/10). Discharge home on 10/11.     Marcy Shahidanarda Paez    Interval History   Seen this morning. Ongoing flat affect, again remained under covers for the duration of my visit. Nervous about psych's plan to stop his benzos, worried things may be rushed. Otherwise no complaints, denies cp/sob/cough, abd pain/n/v.     -Data reviewed today: I reviewed all new labs and imaging results over the last 24 hours. I personally reviewed no images or EKG's today.    Physical Exam   Temp: 98.2  F (36.8  C) Temp src: Oral BP: 115/67 Pulse: 63   Resp: 15 SpO2: 100 % O2 Device: None (Room air)    Vitals:    09/24/21 0642 09/27/21 0628 09/28/21 0627   Weight: 87.9 kg (193 lb 12.6 oz) 86 kg (189 lb 9.5 oz) 86 kg (189 lb 9.6 oz)     Vital Signs with Ranges  Temp:  [97.2  F (36.2  C)-98.2  F (36.8  C)] 98.2  F (36.8  C)  Pulse:  [59-63] 63  Resp:  [14-15] 15  BP: (115-122)/(65-67) 115/67  SpO2:  [98 %-100 %] 100 %  I/O last 3 completed shifts:  In: 240 [P.O.:240]  Out: -     Constitutional: Resting quietly, flat affect, NAD  Respiratory: CTAB, no wheeze/rales/rhonchi, no increased work of breathing  Cardiovascular: HRRR, no MGR, no LE edema  GI: S, NT, ND, +BS  Skin/Integumen: warm/dry, R foot wrapped and not directly examined by me  Other:      Medications       acetaminophen  975 mg Oral Q8H     mirtazapine  15 mg Oral At Bedtime     PHENobarbital  32.4 mg Oral TID     [START ON  10/2/2021] PHENobarbital  32.4 mg Oral BID     [START ON 10/4/2021] PHENobarbital  32.4 mg Oral QAM AC     polyethylene glycol  17 g Oral Daily     senna-docusate  1 tablet Oral BID     sodium chloride (PF)  3 mL Intracatheter Q8H     vancomycin  1,750 mg Intravenous Q12H       Data   Recent Labs   Lab 09/28/21  0702 09/27/21  0652 09/26/21  1227 09/25/21  0546 09/25/21  0546 09/24/21  0952 09/24/21  0928 09/24/21 0928 09/23/21 1926 09/23/21 1926   WBC  --   --   --   --  4.5  --   --  5.7  --  5.8   HGB  --   --   --   --  11.8*  --   --  12.2*  --  12.3*   MCV  --   --   --   --  85  --   --  85  --  84   PLT  --   --   --   --  279  --   --  278  --  252   NA  --   --   --   --   --   --   --  140  --  137   POTASSIUM  --   --   --   --   --   --   --  4.2  --  4.1   CHLORIDE  --   --   --   --   --   --   --  107  --  105   CO2  --   --   --   --   --   --   --  28  --  30   BUN  --   --   --   --   --   --   --  11  --  11   CR 0.91 0.86 0.71   < > 0.73  --    < > 0.74   < > 0.78   ANIONGAP  --   --   --   --   --   --   --  5  --  2*   CAIT  --   --   --   --   --   --   --  8.5  --  9.1   GLC  --   --   --   --  91 116*  --  122*   < > 115*    < > = values in this interval not displayed.       No results found for this or any previous visit (from the past 24 hour(s)).

## 2021-09-29 NOTE — PROGRESS NOTES
Patient A&Ox4,pt refused his VS to be taken. Pulled his Iv out new in . On Iv Vancomycin. Independent in room. Regular diet. Denies pain. Dressing CDI, CMS intact. Discharge pending antibiotic plan.

## 2021-09-29 NOTE — PROGRESS NOTES
Orthopedic Surgery  Zander Blunt  2021  Admit Date:  2021  POD: 6 Days Post-Op   Procedure(s):  IRRIGATION AND DEBRIDEMENT, RIGHT FIRST METATARSAL PHALANGEAL JOINT    Alert and oriented to person, place, and time.  Patient resting comfortably in bed.    Pain controlled.  Tolerating oral intake.    Denies nausea or vomiting  Denies chest pain or shortness of breath    Vital Sign Ranges  Temperature Temp  Av.1  F (36.7  C)  Min: 97.9  F (36.6  C)  Max: 98.2  F (36.8  C)   Blood pressure Systolic (24hrs), Av , Min:115 , Max:118        Diastolic (24hrs), Av, Min:67, Max:69      Pulse Pulse  Av  Min: 55  Max: 63   Respirations Resp  Avg: 15  Min: 15  Max: 15   Pulse oximetry SpO2  Av.5 %  Min: 97 %  Max: 100 %       Dressing is clean, dry, and intact.   Incision healing well.  No erythema of the surrounding skin, suture intact.    Able to flex and extend the right great toe with minimal pain.    Bilateral calves are soft, non-tender.  Right lower extremity is NVI.  Sensation intact bilateral lower extremities  Patient able to resist dorsi and plantar flexion bilaterally  +Dp pulse       Labs:  Recent Labs   Lab Test 21  0928 21  1810   POTASSIUM 4.2 4.1 4.4     Recent Labs   Lab Test 21  0546 21  0928 21   HGB 11.8* 12.2* 12.3*     No results for input(s): INR in the last [id number] hours.  Recent Labs   Lab Test 21  0546 21  0928 21    278 252     A/P  1. S/p 1st toe MTP I&D (septic native joint)              Continue amb and SCDs for DVT prophylaxis.                Mobilize with PT/OT               WBAT with post-op shoe              Leave dressings intact              IV abx per ID.                Continue current pain regimen.   Ortho will recheck patient on POD #14 if still in hospital.  Call if change in symptoms prior to this.       2. Disposition              Anticipate d/c to home based on abx  plan. Ortho stable    Ebony Hernández PA-C

## 2021-09-29 NOTE — PLAN OF CARE
Orientation: A & O x 4.Grieving over recent loss of father.  VS/ O2/ IV: VSS on RA. IV SL. Vanco x 1.   Mobility: Independent in the room.   Pain Management: Denies.   Diet: Regular  Bowel/ Bladder: Continent of bowel and bladder.    Skin: R. Foot dressing c/d/i.   CMS: Intact.   Discharge/ Plan: IV ABX until in hospital 10/10 and then transition to oral abx. Working on transferring pt to private room. Chemical dependency consulted. Psych and I/D following.

## 2021-09-29 NOTE — PROGRESS NOTES
SPIRITUAL HEALTH SERVICES  SPIRITUAL ASSESSMENT Progress Note  [facility] UNIT [facility]    REFERRAL SOURCE: -initated follow up based on chart review    Oriented to [facility] availability during hospital stay, and offered condolences to Zander regarding recent loss of his father. Zander was appreciative of check in but declined a visit at this time.    PLAN: [facility] Services remains available for patient, family, and staff support.     Please page the on call  by placing a STAT or ASAP Epic referral for Spiritual Health (which will roll to the on call pager).      LEANDRO Veloz.   Associate   Pager [phone]

## 2021-09-30 LAB
BACTERIA WND CULT: NORMAL
CREAT SERPL-MCNC: 0.82 MG/DL (ref 0.66–1.25)
GFR SERPL CREATININE-BSD FRML MDRD: >90 ML/MIN/1.73M2

## 2021-09-30 PROCEDURE — 250N000013 HC RX MED GY IP 250 OP 250 PS 637: Performed by: PSYCHIATRY & NEUROLOGY

## 2021-09-30 PROCEDURE — 250N000011 HC RX IP 250 OP 636: Performed by: INTERNAL MEDICINE

## 2021-09-30 PROCEDURE — 36415 COLL VENOUS BLD VENIPUNCTURE: CPT

## 2021-09-30 PROCEDURE — 258N000003 HC RX IP 258 OP 636: Performed by: INTERNAL MEDICINE

## 2021-09-30 PROCEDURE — 120N000001 HC R&B MED SURG/OB

## 2021-09-30 PROCEDURE — 82565 ASSAY OF CREATININE: CPT

## 2021-09-30 PROCEDURE — 99232 SBSQ HOSP IP/OBS MODERATE 35: CPT | Performed by: INTERNAL MEDICINE

## 2021-09-30 RX ADMIN — PHENOBARBITAL 32.4 MG: 32.4 TABLET ORAL at 16:07

## 2021-09-30 RX ADMIN — VANCOMYCIN HYDROCHLORIDE 1750 MG: 1 INJECTION, POWDER, LYOPHILIZED, FOR SOLUTION INTRAVENOUS at 22:08

## 2021-09-30 RX ADMIN — PHENOBARBITAL 32.4 MG: 32.4 TABLET ORAL at 22:09

## 2021-09-30 RX ADMIN — MIRTAZAPINE 15 MG: 15 TABLET, FILM COATED ORAL at 22:09

## 2021-09-30 RX ADMIN — PHENOBARBITAL 32.4 MG: 32.4 TABLET ORAL at 10:05

## 2021-09-30 RX ADMIN — VANCOMYCIN HYDROCHLORIDE 1750 MG: 1 INJECTION, POWDER, LYOPHILIZED, FOR SOLUTION INTRAVENOUS at 10:03

## 2021-09-30 RX ADMIN — VANCOMYCIN HYDROCHLORIDE 1750 MG: 1 INJECTION, POWDER, LYOPHILIZED, FOR SOLUTION INTRAVENOUS at 11:12

## 2021-09-30 ASSESSMENT — ACTIVITIES OF DAILY LIVING (ADL)
ADLS_ACUITY_SCORE: 5

## 2021-09-30 NOTE — CONSULTS
9/30/2021      Pt has been interviewed via [facility] on 9/24/21 for CD Consult. Pt had reported he was  not interested in inpatient treatment and was intending to pursue outpatient CD services after discharge. Per EHR review, pt is receiving IV antibiotics until 10/10. CD treatment centers will not accept pt's on IV antibiotics and won't put them on the wait list. If pt would like referrals to inpatient CD treatment prior to discharge, CD Consult can be reordered 5 days prior to discharge.     KASHMIR Carlos  Phone: [phone]  Email: [email or web address]

## 2021-09-30 NOTE — PROGRESS NOTES
[facility]    Hospitalist Progress Note    Assessment & Plan   Zander Blunt is a 35 year old mal with PMHx of depression, anxiety and substance abuse including IV drug use who was admitted on 9/23/2021 after undergoing an emergent surgery for septic right first metatarsophalangeal joint infection with orthopedic surgery     Septic arthritis of the first metatarsophalangeal joint dt staph aureus, s/p I&D 9/23/21  * Preceding current foot infection, patient had an infection in his arm (a week prior) related to IV drug use. He was given doxycycline which he took for a week prior to presentation. He then developed his current foot/toe issues. He does not inject in his foot.   * Had been seen in ortho clinic on 9/22. At that time, ortho thought his foot pain was gout flare and had cortisone injected after aspiration. Fluid cx showed 389k WBC and staph aureus; patient was directed to come to hospital for intervention.   * Underwent I&D of right first MTP on 9/23 per ortho.  * Blood cultures remained negative this stay.  * Echo this stay: EF 60-65%, mild concentric LVH, RV normal. LV mildly dilated, no valvular vegetations seen. IVC normal in size.  * HIV neg, Hep B Ab+, hep C nonreactive.  * Initially managed with IV Ancef, changed to Vancomycin 9/25 after wound cultures were found to grow staph aureus (MRSA).   * Discharge abx plan complicated given hx of IV drug use -- not candidate for home infusion, not candidate for any TCUs  -- ID now planning to complete 2wk course of IV Vanco through 10/10, then transition to linezolid po x2 wks (covered by insurance)  -- will remain in hospital to complete course of Vanco  -- routine postop cares per ortho including, dressing changes; WBAT with postop shoe  -- not needing anything for pain postop    Depression  Anxiety  * Chronic and stable on home meds including Xanax and Trazodone  * Psych consulted per patient request given death of his father on  9/27  * Placed on phenobarb taper and benzos dc'd per Dr. Washington on 9/29  -- consider psych reconsult as needed     Anemia, likely chronic  Hgb low but stable at 12 this stay.      Hx Substance abuse  Hx IV drug use. Reportedly treated in 2006/2007 per records  Met with chem dep counselor earlier this stay. Will follow up after discharge for outpatient CD treatment.     FEN: no IVFs, lytes stable, regular diet  DVT Prophylaxis: PCDs  Code Status: Full Code    Disposition: Will remain hospitalized to complete course of Vanco (will cont through 10/10). Discharge home on 10/11.     Aristeo Johnson    Interval History   Patient seen and evaluated in his room today, was sleeping with coves on his face, remain under the cover, denies any pain or complaints at this time, no fever, chills, chest pain, SOB, headache or dizziness.     No other significant event overnight.     -Data reviewed today: I reviewed all new labs and imaging results over the last 24 hours. I personally reviewed no images or EKG's today.    Physical Exam   Temp: 98.5  F (36.9  C) Temp src: Oral BP: 117/71 Pulse: 60   Resp: 16 SpO2: 99 % O2 Device: None (Room air)    Vitals:    09/24/21 0642 09/27/21 0628 09/28/21 0627   Weight: 87.9 kg (193 lb 12.6 oz) 86 kg (189 lb 9.5 oz) 86 kg (189 lb 9.6 oz)     Vital Signs with Ranges  Temp:  [98.2  F (36.8  C)-98.5  F (36.9  C)] 98.5  F (36.9  C)  Pulse:  [60] 60  Resp:  [15-16] 16  BP: (117-124)/(71-79) 117/71  SpO2:  [92 %-99 %] 99 %  No intake/output data recorded.    Constitutional: Resting quietly, flat affect, NAD  Respiratory: CTAB, no wheeze/rales/rhonchi, no increased work of breathing  Cardiovascular: HRRR, no MGR, no LE edema  GI: S, NT, ND, +BS  Skin/Integumen: warm/dry, R foot wrapped and not directly examined by me  Other:      Medications       acetaminophen  975 mg Oral Q8H     mirtazapine  15 mg Oral At Bedtime     PHENobarbital  32.4 mg Oral TID     [START ON 10/2/2021] PHENobarbital  32.4 mg  Oral BID     [START ON 10/4/2021] PHENobarbital  32.4 mg Oral QAM AC     polyethylene glycol  17 g Oral Daily     senna-docusate  1 tablet Oral BID     sodium chloride (PF)  3 mL Intracatheter Q8H     vancomycin  1,750 mg Intravenous Q12H       Data   Recent Labs   Lab 09/30/21  0727 09/29/21 2024 09/28/21  0702 09/26/21  1227 09/25/21  0546 09/24/21  0952 09/24/21 0928 09/24/21 0928 09/23/21 1926 09/23/21 1926   WBC  --   --   --   --  4.5  --   --  5.7  --  5.8   HGB  --   --   --   --  11.8*  --   --  12.2*  --  12.3*   MCV  --   --   --   --  85  --   --  85  --  84   PLT  --   --   --   --  279  --   --  278  --  252   NA  --   --   --   --   --   --   --  140  --  137   POTASSIUM  --   --   --   --   --   --   --  4.2  --  4.1   CHLORIDE  --   --   --   --   --   --   --  107  --  105   CO2  --   --   --   --   --   --   --  28  --  30   BUN  --   --   --   --   --   --   --  11  --  11   CR 0.82 0.88 0.91   < > 0.73  --    < > 0.74   < > 0.78   ANIONGAP  --   --   --   --   --   --   --  5  --  2*   CAIT  --   --   --   --   --   --   --  8.5  --  9.1   GLC  --   --   --   --  91 116*  --  122*   < > 115*    < > = values in this interval not displayed.       No results found for this or any previous visit (from the past 24 hour(s)).

## 2021-09-30 NOTE — PROGRESS NOTES
Patient vital signs are at baseline: Yes  Patient able to ambulate as they were prior to admission or with assist devices provided by therapies during their stay:  Yes  Patient MUST void prior to discharge:  Yes  Patient able to tolerate oral intake:  Yes  Pain has adequate pain control using Oral analgesics:  Yes     Pt refusing tylenol. Denies pain. Pt continues on IV ABX.

## 2021-09-30 NOTE — PROGRESS NOTES
BRIEF NUTRITION FOLLOW UP    CURRENT DIET AND INTAKE:  Diet: Regular diet            100% intakes recorded over the past several days. Pt receives ~2 well balanced meals daily, and usually an additional tray with fruit or yogurt. Good appetite recorded.     ANTHROPOMETRICS:  Vitals:    09/24/21 0642 09/27/21 0628 09/28/21 0627   Weight: 87.9 kg (193 lb 12.6 oz) 86 kg (189 lb 9.5 oz) 86 kg (189 lb 9.6 oz)     Pt reported a UBW of 185# last visit.     LABS:  Reviewed     MALNUTRITION:  Visual Nutrition Focused Physical Assessment (NFPA) completed - no muscle/fat losses noted. (done 9/24)   Patient does not meet two of the following criteria necessary for diagnosing malnutrition.      % Weight Loss:  None noted  % Intake:  No decreased intake noted  Subcutaneous Fat Loss:  None observed  Muscle Loss:  None observed  Fluid Retention:  None noted    NUTRITION INTERVENTION:  Nutrition Diagnosis:  No nutrition diagnosis at this time.    Implementation:  Nutrition Education:  Per Provider order if indicated.    FOLLOW UP/MONITORING:   Will re-evaluate in 7 - 10 days, or sooner, if re-consulted.    Alessandra Granger RD,   [facility], [address], [facility]   Pager: [phone]  Weekend Pager: [phone]

## 2021-09-30 NOTE — PLAN OF CARE
Pt A/O x4, cms intact, pt refused vital signs over night. R. Foot dressing CDI. Ind in room, refused pain medication, denies pain. Continuing IV antibiotics until 10/10, continue to monitor.

## 2021-09-30 NOTE — PHARMACY-VANCOMYCIN DOSING SERVICE
Pharmacy Vancomycin Note  Date of Service 2021  Patient's  1985   35 year old, male    Indication: Bone and Joint Infection  Day of Therapy: 5  Current vancomycin regimen:  1750 mg IV q12h  Current vancomycin monitoring method: AUC  Current vancomycin therapeutic monitoring goal: 400-600 mg*h/L    Current estimated CrCl = Estimated Creatinine Clearance: 139.8 mL/min (based on SCr of 0.88 mg/dL).    Creatinine for last 3 days  2021:  6:52 AM Creatinine 0.86 mg/dL  2021:  7:02 AM Creatinine 0.91 mg/dL  2021:  8:24 PM Creatinine 0.88 mg/dL    Recent Vancomycin Levels (past 3 days)  2021:  8:24 PM Vancomycin 9.4 mg/L    Vancomycin IV Administrations (past 72 hours)                   vancomycin (VANCOCIN) 1,750 mg in sodium chloride 0.9 % 500 mL intermittent infusion (mg) 1,750 mg Given 21 0851     1,750 mg Given 21     1,750 mg Given  0603     1,750 mg Given 21 1756     1,750 mg Given  0337                Nephrotoxins and other renal medications (From now, onward)    Start     Dose/Rate Route Frequency Ordered Stop    21 1400  vancomycin (VANCOCIN) 1,750 mg in sodium chloride 0.9 % 500 mL intermittent infusion      1,750 mg  over 2 Hours Intravenous EVERY 12 HOURS 21 1340      21 2147  ibuprofen (ADVIL/MOTRIN) tablet 600 mg      600 mg Oral EVERY 6 HOURS PRN 21 2147               Contrast Orders - past 72 hours (72h ago, onward)    None          Interpretation of levels and current regimen:  Vancomycin level is reflective of -600    Has serum creatinine changed greater than 50% in last 72 hours: No    Urine output:  unable to determine    Renal Function: Stable    Regimen: 1750 mg IV every 12 hours.  Exposure target: AUC24 (range)400-600 mg/L.hr   AUC24,ss: 474 mg/L.hr  Probability of AUC24 > 400: 85 %  Ctrough,ss: 10.4 mg/L  Probability of Ctrough,ss > 20: 0 %  Probability of nephrotoxicity (Lodise HAO 2009): 6  %    Plan:  1. Continue Current Dose  2. Vancomycin monitoring method: AUC  3. Vancomycin therapeutic monitoring goal: 400-600 mg*h/L  4. Pharmacy will check vancomycin levels as appropriate in 1-3 Days.  5. Serum creatinine levels will be ordered a minimum of twice weekly.    Stephanie Andres RP

## 2021-10-01 LAB — SARS-COV-2 RNA RESP QL NAA+PROBE: NEGATIVE

## 2021-10-01 PROCEDURE — 250N000011 HC RX IP 250 OP 636: Performed by: INTERNAL MEDICINE

## 2021-10-01 PROCEDURE — 250N000013 HC RX MED GY IP 250 OP 250 PS 637: Performed by: ORTHOPAEDIC SURGERY

## 2021-10-01 PROCEDURE — 258N000003 HC RX IP 258 OP 636: Performed by: INTERNAL MEDICINE

## 2021-10-01 PROCEDURE — 99232 SBSQ HOSP IP/OBS MODERATE 35: CPT | Performed by: INTERNAL MEDICINE

## 2021-10-01 PROCEDURE — 120N000001 HC R&B MED SURG/OB

## 2021-10-01 PROCEDURE — 250N000013 HC RX MED GY IP 250 OP 250 PS 637: Performed by: PSYCHIATRY & NEUROLOGY

## 2021-10-01 PROCEDURE — 87635 SARS-COV-2 COVID-19 AMP PRB: CPT | Performed by: INTERNAL MEDICINE

## 2021-10-01 RX ADMIN — SENNOSIDES AND DOCUSATE SODIUM 1 TABLET: 8.6; 5 TABLET ORAL at 10:12

## 2021-10-01 RX ADMIN — PHENOBARBITAL 32.4 MG: 32.4 TABLET ORAL at 10:11

## 2021-10-01 RX ADMIN — VANCOMYCIN HYDROCHLORIDE 1750 MG: 1 INJECTION, POWDER, LYOPHILIZED, FOR SOLUTION INTRAVENOUS at 10:14

## 2021-10-01 RX ADMIN — PHENOBARBITAL 32.4 MG: 32.4 TABLET ORAL at 16:11

## 2021-10-01 RX ADMIN — PHENOBARBITAL 32.4 MG: 32.4 TABLET ORAL at 21:33

## 2021-10-01 RX ADMIN — MIRTAZAPINE 15 MG: 15 TABLET, FILM COATED ORAL at 21:33

## 2021-10-01 RX ADMIN — SENNOSIDES AND DOCUSATE SODIUM 1 TABLET: 8.6; 5 TABLET ORAL at 21:33

## 2021-10-01 RX ADMIN — VANCOMYCIN HYDROCHLORIDE 1750 MG: 1 INJECTION, POWDER, LYOPHILIZED, FOR SOLUTION INTRAVENOUS at 21:34

## 2021-10-01 RX ADMIN — POLYETHYLENE GLYCOL 3350 17 G: 17 POWDER, FOR SOLUTION ORAL at 10:11

## 2021-10-01 ASSESSMENT — ACTIVITIES OF DAILY LIVING (ADL)
ADLS_ACUITY_SCORE: 5

## 2021-10-01 NOTE — PLAN OF CARE
A&Ox4. Denies pain, refused tylenol. CMS intact. R foot dressing CDI, does not wear ortho shoe when OOB in room, independent in room. IV infusing Vanco, SL otherwise. Will be here till 10/10 to finish IV antibiotics. Continue to monitor.

## 2021-10-01 NOTE — PLAN OF CARE
Pt is A & O x 4. Lungs sound clear, bowel sounds active, cms intact. Up independently. Denies pain. On IV antibiotics. SL. Flat affect. Will continue to monitor.

## 2021-10-01 NOTE — PROGRESS NOTES
[facility]    Hospitalist Progress Note    Assessment & Plan   Zander Blunt is a 35 year old mal with PMHx of depression, anxiety and substance abuse including IV drug use who was admitted on 9/23/2021 after undergoing an emergent surgery for septic right first metatarsophalangeal joint infection with orthopedic surgery     Septic arthritis of the first metatarsophalangeal joint dt staph aureus, s/p I&D 9/23/21  * Preceding current foot infection, patient had an infection in his arm (a week prior) related to IV drug use. He was given doxycycline which he took for a week prior to presentation. He then developed his current foot/toe issues. He does not inject in his foot.   * Had been seen in ortho clinic on 9/22. At that time, ortho thought his foot pain was gout flare and had cortisone injected after aspiration. Fluid cx showed 389k WBC and staph aureus; patient was directed to come to hospital for intervention.   * Underwent I&D of right first MTP on 9/23 per ortho.  * Blood cultures remained negative this stay.  * Echo this stay: EF 60-65%, mild concentric LVH, RV normal. LV mildly dilated, no valvular vegetations seen. IVC normal in size.  * HIV neg, Hep B Ab+, hep C nonreactive.  * Initially managed with IV Ancef, changed to Vancomycin 9/25 after wound cultures were found to grow staph aureus (MRSA).   * Discharge abx plan complicated given hx of IV drug use -- not candidate for home infusion, not candidate for any TCUs  -- ID now planning to complete 2wk course of IV Vanco through 10/10, then transition to linezolid po x2 wks (covered by insurance)  -- will remain in hospital to complete course of Vanco  -- routine postop cares per ortho including, dressing changes; WBAT with postop shoe  -- not needing anything for pain postop    Overall remain stable, continue IV Vancomycin till 10/10    Depression  Anxiety  * Chronic and stable on home meds including Xanax and Trazodone  * Psych  consulted per patient request given death of his father on 9/27  * Placed on phenobarb taper and benzos dc'd per Dr. Washington on 9/29  -- consider psych reconsult as needed     Anemia, likely chronic  Hgb low but stable at 12 this stay.      Hx Substance abuse  Hx IV drug use. Reportedly treated in 2006/2007 per records  Met with chem dep counselor earlier this stay. Will follow up after discharge for outpatient CD treatment.     FEN: no IVFs, lytes stable, regular diet  DVT Prophylaxis: PCDs  Code Status: Full Code    Disposition: Will remain hospitalized to complete course of Vanco (will cont through 10/10). Discharge home on 10/11.     Aristeo Johnson    Interval History   Patient told me that he is tired as unable to sleep well overnight because of the interruption, no fever, chills, chest pain, SOB, headache or dizziness, pain is manageable.      No other significant event overnight.     -Data reviewed today: I reviewed all new labs and imaging results over the last 24 hours. I personally reviewed no images or EKG's today.    Physical Exam   Temp: 97.8  F (36.6  C) Temp src: Oral BP: 95/50 Pulse: 56   Resp: 16 SpO2: 97 % O2 Device: None (Room air)    Vitals:    09/24/21 0642 09/27/21 0628 09/28/21 0627   Weight: 87.9 kg (193 lb 12.6 oz) 86 kg (189 lb 9.5 oz) 86 kg (189 lb 9.6 oz)     Vital Signs with Ranges  Temp:  [97.8  F (36.6  C)-98.2  F (36.8  C)] 97.8  F (36.6  C)  Pulse:  [56-90] 56  Resp:  [16] 16  BP: ()/(50-96) 95/50  SpO2:  [97 %-100 %] 97 %  No intake/output data recorded.    Constitutional: Resting quietly, flat affect, NAD  Respiratory: CTAB, no wheeze/rales/rhonchi, no increased work of breathing  Cardiovascular: HRRR, no MGR, no LE edema  GI: S, NT, ND, +BS  Skin/Integumen: warm/dry, R foot wrapped and not directly examined by me  Other:      Medications       acetaminophen  975 mg Oral Q8H     mirtazapine  15 mg Oral At Bedtime     PHENobarbital  32.4 mg Oral TID     [START ON 10/2/2021]  PHENobarbital  32.4 mg Oral BID     [START ON 10/4/2021] PHENobarbital  32.4 mg Oral QAM AC     polyethylene glycol  17 g Oral Daily     senna-docusate  1 tablet Oral BID     sodium chloride (PF)  3 mL Intracatheter Q8H     vancomycin  1,750 mg Intravenous Q12H       Data   Recent Labs   Lab 09/30/21 0727 09/29/21 2024 09/28/21  0702 09/26/21  1227 09/25/21  0546   WBC  --   --   --   --  4.5   HGB  --   --   --   --  11.8*   MCV  --   --   --   --  85   PLT  --   --   --   --  279   CR 0.82 0.88 0.91   < > 0.73   GLC  --   --   --   --  91    < > = values in this interval not displayed.       No results found for this or any previous visit (from the past 24 hour(s)).

## 2021-10-01 NOTE — PLAN OF CARE
Pt is A&O x4, flat affect. VSS on RA. Denies pain, refuses to take scheduled tylenol. Independent in the room. CMS intact and dressing CDI. Will continue to monitor.

## 2021-10-02 LAB — VANCOMYCIN SERPL-MCNC: 11.6 MG/L

## 2021-10-02 PROCEDURE — 36415 COLL VENOUS BLD VENIPUNCTURE: CPT | Performed by: INTERNAL MEDICINE

## 2021-10-02 PROCEDURE — 258N000003 HC RX IP 258 OP 636: Performed by: INTERNAL MEDICINE

## 2021-10-02 PROCEDURE — 250N000013 HC RX MED GY IP 250 OP 250 PS 637: Performed by: PSYCHIATRY & NEUROLOGY

## 2021-10-02 PROCEDURE — 80202 ASSAY OF VANCOMYCIN: CPT | Performed by: INTERNAL MEDICINE

## 2021-10-02 PROCEDURE — 250N000013 HC RX MED GY IP 250 OP 250 PS 637: Performed by: ORTHOPAEDIC SURGERY

## 2021-10-02 PROCEDURE — 99231 SBSQ HOSP IP/OBS SF/LOW 25: CPT | Performed by: INTERNAL MEDICINE

## 2021-10-02 PROCEDURE — 120N000001 HC R&B MED SURG/OB

## 2021-10-02 PROCEDURE — 250N000011 HC RX IP 250 OP 636: Performed by: INTERNAL MEDICINE

## 2021-10-02 RX ADMIN — SENNOSIDES AND DOCUSATE SODIUM 1 TABLET: 8.6; 5 TABLET ORAL at 22:35

## 2021-10-02 RX ADMIN — PHENOBARBITAL 32.4 MG: 32.4 TABLET ORAL at 10:56

## 2021-10-02 RX ADMIN — VANCOMYCIN HYDROCHLORIDE 1750 MG: 1 INJECTION, POWDER, LYOPHILIZED, FOR SOLUTION INTRAVENOUS at 10:56

## 2021-10-02 RX ADMIN — POLYETHYLENE GLYCOL 3350 17 G: 17 POWDER, FOR SOLUTION ORAL at 10:57

## 2021-10-02 RX ADMIN — VANCOMYCIN HYDROCHLORIDE 1750 MG: 1 INJECTION, POWDER, LYOPHILIZED, FOR SOLUTION INTRAVENOUS at 22:24

## 2021-10-02 RX ADMIN — MIRTAZAPINE 15 MG: 15 TABLET, FILM COATED ORAL at 21:51

## 2021-10-02 RX ADMIN — SENNOSIDES AND DOCUSATE SODIUM 1 TABLET: 8.6; 5 TABLET ORAL at 10:56

## 2021-10-02 RX ADMIN — PHENOBARBITAL 32.4 MG: 32.4 TABLET ORAL at 21:51

## 2021-10-02 ASSESSMENT — ACTIVITIES OF DAILY LIVING (ADL)
ADLS_ACUITY_SCORE: 5

## 2021-10-02 NOTE — PROGRESS NOTES
[facility]  Hospitalist Progress Note  Wale Thakur MD  10/02/2021    Assessment & Plan   Zander Blunt is a 35 year old mal with PMHx of depression, anxiety and substance abuse including IV drug use who was admitted on 9/23/2021 after undergoing an emergent surgery for septic right first metatarsophalangeal joint infection with orthopedic surgery     Septic arthritis of the first metatarsophalangeal joint dt staph aureus, s/p I&D 9/23/21  * Preceding current foot infection, patient had an infection in his arm (a week prior) related to IV drug use. He was given doxycycline which he took for a week prior to presentation. He then developed his current foot/toe issues. He does not inject in his foot.   * Had been seen in ortho clinic on 9/22. At that time, ortho thought his foot pain was gout flare and had cortisone injected after aspiration. Fluid cx showed 389k WBC and staph aureus; patient was directed to come to hospital for intervention.   * Underwent I&D of right first MTP on 9/23 per ortho.  * Blood cultures remained negative this stay.  * Echo this stay: EF 60-65%, mild concentric LVH, RV normal. LV mildly dilated, no valvular vegetations seen. IVC normal in size.  * HIV neg, Hep B Ab+, hep C nonreactive.  * Initially managed with IV Ancef, changed to Vancomycin 9/25 after wound cultures were found to grow staph aureus (MRSA).   * Discharge abx plan complicated given hx of IV drug use -- not candidate for home infusion, not candidate for any TCUs  -- ID now planning to complete 2wk course of IV Vanco through 10/10, then transition to linezolid po x2 wks (covered by insurance)  -- will remain in hospital to complete course of Vanco  -- routine postop cares per ortho including, dressing changes; WBAT with postop shoe  -- not needing anything for pain postop     Overall remain stable, continue IV Vancomycin till 10/10     Depression  Anxiety  * Chronic and stable on home meds  including Xanax and Trazodone  * Psych consulted per patient request given death of his father on 9/27  * Placed on phenobarb taper and benzos dc'd per Dr. Washington on 9/29  -- consider psych reconsult as needed     Anemia, likely chronic  Hgb low but stable at 12 this stay.      Hx Substance abuse  Hx IV drug use. Reportedly treated in 2006/2007 per records  Met with chem dep counselor earlier this stay. Will follow up after discharge for outpatient CD treatment.     FEN: no IVFs, lytes stable, regular diet  DVT Prophylaxis: PCDs  Code Status: Full Code     Disposition: Will remain hospitalized to complete course of Vanco (will cont through 10/10). Discharge home on 10/11.     Interval History   -- chart reviewed  -- ID notes reviewed  -- patient offers no complaints        -Data reviewed today: I reviewed all new labs and imaging over the last 24 hours. I personally reviewed no images or EKG's today.    Physical Exam    , Blood pressure 118/72, pulse 61, temperature 98  F (36.7  C), temperature source Oral, resp. rate 16, weight 84.6 kg (186 lb 6.4 oz), SpO2 98 %.  Vitals:    09/27/21 0628 09/28/21 0627 10/02/21 0644   Weight: 86 kg (189 lb 9.5 oz) 86 kg (189 lb 9.6 oz) 84.6 kg (186 lb 6.4 oz)     Vital Signs with Ranges  Temp:  [98  F (36.7  C)-98.4  F (36.9  C)] 98  F (36.7  C)  Pulse:  [58-68] 61  Resp:  [14-16] 16  BP: (105-118)/(56-72) 118/72  SpO2:  [97 %-100 %] 98 %  I/O's Last 24 hours  I/O last 3 completed shifts:  In: 240 [P.O.:240]  Out: -          Medications   All medications were reviewed.      acetaminophen  975 mg Oral Q8H     mirtazapine  15 mg Oral At Bedtime     PHENobarbital  32.4 mg Oral BID     [START ON 10/4/2021] PHENobarbital  32.4 mg Oral QAM AC     polyethylene glycol  17 g Oral Daily     senna-docusate  1 tablet Oral BID     sodium chloride (PF)  3 mL Intracatheter Q8H     vancomycin  1,750 mg Intravenous Q12H        Data   Recent Labs   Lab 09/30/21  0727 09/29/21 2024  09/28/21  0702   CR 0.82 0.88 0.91       No results found for this or any previous visit (from the past 24 hour(s)).    Wale Thakur MD  Text Page  (7am to 6pm)

## 2021-10-02 NOTE — PHARMACY-VANCOMYCIN DOSING SERVICE
Pharmacy Vancomycin Note  Date of Service 2021  Patient's  1985   35 year old, male    Indication: Osteomyelitis  Day of Therapy: 9  Current vancomycin regimen:  1750 mg IV q12h  Current vancomycin monitoring method: AUC  Current vancomycin therapeutic monitoring goal: 400-600 mg*h/L    Current estimated CrCl = Estimated Creatinine Clearance: 147.6 mL/min (based on SCr of 0.82 mg/dL).    Creatinine for last 3 days  2021:  8:24 PM Creatinine 0.88 mg/dL  2021:  7:27 AM Creatinine 0.82 mg/dL    Recent Vancomycin Levels (past 3 days)  2021:  8:24 PM Vancomycin 9.4 mg/L  10/2/2021:  8:57 AM Vancomycin 11.6 mg/L    Vancomycin IV Administrations (past 72 hours)                   vancomycin (VANCOCIN) 1,750 mg in sodium chloride 0.9 % 500 mL intermittent infusion (mg) 1,750 mg Given 10/02/21 1056     1,750 mg Given 10/01/21 2134     1,750 mg Given  1014     1,750 mg Given 21 2208     1,750 mg Given  1112     1,750 mg Given  1003     1,750 mg Given 21 2145                Nephrotoxins and other renal medications (From now, onward)    Start     Dose/Rate Route Frequency Ordered Stop    21 1400  vancomycin (VANCOCIN) 1,750 mg in sodium chloride 0.9 % 500 mL intermittent infusion      1,750 mg  over 2 Hours Intravenous EVERY 12 HOURS 21 1340      21 2147  ibuprofen (ADVIL/MOTRIN) tablet 600 mg      600 mg Oral EVERY 6 HOURS PRN 21 2147               Contrast Orders - past 72 hours (72h ago, onward)    None          Interpretation of levels and current regimen:  Vancomycin level is reflective of -600    Has serum creatinine changed greater than 50% in last 72 hours: No    Urine output:  good urine output    Renal Function: Stable    Regimen: 1750 mg IV every 12 hours.  Start time: 11:06 on 10/02/2021  Exposure target: AUC24 (range)400-600 mg/L.hr   AUC24,ss: 456 mg/L.hr  Probability of AUC24 > 400: 80 %  Ctrough,ss: 9.6 mg/L  Probability of Ctrough,ss >  20: 0 %  Probability of nephrotoxicity (Lodise HAO 2009): 6 %      Plan:  1. Continue Current Dose  2. Vancomycin monitoring method: AUC  3. Vancomycin therapeutic monitoring goal: 400-600 mg*h/L  4. Pharmacy will check vancomycin levels as appropriate in 3-5 Days.  5. Serum creatinine levels will be ordered a minimum of twice weekly.    Jeri Bhatt RP

## 2021-10-02 NOTE — PROGRESS NOTES
Patient vital signs are at baseline: Yes   Patient able to ambulate as they were prior to admission or with assist devices provided by therapies during their stay: Yes  Patient MUST void prior to discharge:yes  Patient able to tolerate oral intake:yes  Pain has adequate pain control using Oral analgesics: yes    A/O X4 VSS O2sats  98% on RMA  denies pain. Independent, CMS and dressing intact, will continue with current plan of care

## 2021-10-03 LAB
CREAT SERPL-MCNC: 0.8 MG/DL (ref 0.66–1.25)
GFR SERPL CREATININE-BSD FRML MDRD: >90 ML/MIN/1.73M2

## 2021-10-03 PROCEDURE — 250N000013 HC RX MED GY IP 250 OP 250 PS 637: Performed by: PHYSICIAN ASSISTANT

## 2021-10-03 PROCEDURE — 250N000013 HC RX MED GY IP 250 OP 250 PS 637: Performed by: INTERNAL MEDICINE

## 2021-10-03 PROCEDURE — 250N000013 HC RX MED GY IP 250 OP 250 PS 637: Performed by: ORTHOPAEDIC SURGERY

## 2021-10-03 PROCEDURE — 250N000013 HC RX MED GY IP 250 OP 250 PS 637: Performed by: PSYCHIATRY & NEUROLOGY

## 2021-10-03 PROCEDURE — 36415 COLL VENOUS BLD VENIPUNCTURE: CPT | Performed by: INTERNAL MEDICINE

## 2021-10-03 PROCEDURE — 250N000011 HC RX IP 250 OP 636: Performed by: INTERNAL MEDICINE

## 2021-10-03 PROCEDURE — 120N000001 HC R&B MED SURG/OB

## 2021-10-03 PROCEDURE — 258N000003 HC RX IP 258 OP 636: Performed by: INTERNAL MEDICINE

## 2021-10-03 PROCEDURE — 82565 ASSAY OF CREATININE: CPT | Performed by: INTERNAL MEDICINE

## 2021-10-03 PROCEDURE — 99231 SBSQ HOSP IP/OBS SF/LOW 25: CPT | Performed by: INTERNAL MEDICINE

## 2021-10-03 RX ORDER — NICOTINE 21 MG/24HR
1 PATCH, TRANSDERMAL 24 HOURS TRANSDERMAL DAILY
Status: DISCONTINUED | OUTPATIENT
Start: 2021-10-03 | End: 2021-10-10 | Stop reason: HOSPADM

## 2021-10-03 RX ADMIN — VANCOMYCIN HYDROCHLORIDE 1750 MG: 1 INJECTION, POWDER, LYOPHILIZED, FOR SOLUTION INTRAVENOUS at 21:59

## 2021-10-03 RX ADMIN — NICOTINE 1 PATCH: 21 PATCH, EXTENDED RELEASE TRANSDERMAL at 23:59

## 2021-10-03 RX ADMIN — SENNOSIDES AND DOCUSATE SODIUM 1 TABLET: 8.6; 5 TABLET ORAL at 21:58

## 2021-10-03 RX ADMIN — PHENOBARBITAL 32.4 MG: 32.4 TABLET ORAL at 21:58

## 2021-10-03 RX ADMIN — TRAZODONE HYDROCHLORIDE 100 MG: 50 TABLET ORAL at 23:59

## 2021-10-03 RX ADMIN — PHENOBARBITAL 32.4 MG: 32.4 TABLET ORAL at 08:19

## 2021-10-03 RX ADMIN — MIRTAZAPINE 15 MG: 15 TABLET, FILM COATED ORAL at 21:58

## 2021-10-03 RX ADMIN — VANCOMYCIN HYDROCHLORIDE 1750 MG: 1 INJECTION, POWDER, LYOPHILIZED, FOR SOLUTION INTRAVENOUS at 10:21

## 2021-10-03 ASSESSMENT — ACTIVITIES OF DAILY LIVING (ADL)
ADLS_ACUITY_SCORE: 7

## 2021-10-03 NOTE — PROGRESS NOTES
Pt is A & O x 4. Lungs sound clear, bowel sounds active, cms intact. Up Independently. Suppose to use post op shoes when OOB. On IV Vanco. Denies pain, refused tylenol. Will continue to monitor. Will be inpatient until at least 10/10. Likes to go for walks.

## 2021-10-03 NOTE — PLAN OF CARE
A&OX4, VSS on RA. Denies pain, n&V. Dressing on the  R foot c/d/i. Regular diet. Up independently. PIV Sled. Discharge pending once IV abx completed on 10/10. Continue to monitor.

## 2021-10-04 LAB
CREAT SERPL-MCNC: 0.79 MG/DL (ref 0.66–1.25)
GFR SERPL CREATININE-BSD FRML MDRD: >90 ML/MIN/1.73M2

## 2021-10-04 PROCEDURE — 82565 ASSAY OF CREATININE: CPT

## 2021-10-04 PROCEDURE — 120N000001 HC R&B MED SURG/OB

## 2021-10-04 PROCEDURE — 258N000003 HC RX IP 258 OP 636: Performed by: INTERNAL MEDICINE

## 2021-10-04 PROCEDURE — 99231 SBSQ HOSP IP/OBS SF/LOW 25: CPT | Performed by: INTERNAL MEDICINE

## 2021-10-04 PROCEDURE — 250N000011 HC RX IP 250 OP 636: Performed by: INTERNAL MEDICINE

## 2021-10-04 PROCEDURE — 250N000013 HC RX MED GY IP 250 OP 250 PS 637: Performed by: PHYSICIAN ASSISTANT

## 2021-10-04 PROCEDURE — 36415 COLL VENOUS BLD VENIPUNCTURE: CPT

## 2021-10-04 PROCEDURE — 250N000013 HC RX MED GY IP 250 OP 250 PS 637: Performed by: PSYCHIATRY & NEUROLOGY

## 2021-10-04 RX ORDER — ACETAMINOPHEN 325 MG/1
650 TABLET ORAL EVERY 4 HOURS PRN
Qty: 20 TABLET | Refills: 0 | Status: SHIPPED | OUTPATIENT
Start: 2021-10-04

## 2021-10-04 RX ORDER — IBUPROFEN 600 MG/1
600 TABLET, FILM COATED ORAL EVERY 6 HOURS PRN
Qty: 20 TABLET | Refills: 0 | Status: SHIPPED | OUTPATIENT
Start: 2021-10-04

## 2021-10-04 RX ADMIN — PHENOBARBITAL 32.4 MG: 32.4 TABLET ORAL at 06:36

## 2021-10-04 RX ADMIN — NICOTINE 1 PATCH: 21 PATCH, EXTENDED RELEASE TRANSDERMAL at 21:28

## 2021-10-04 RX ADMIN — VANCOMYCIN HYDROCHLORIDE 1750 MG: 1 INJECTION, POWDER, LYOPHILIZED, FOR SOLUTION INTRAVENOUS at 10:40

## 2021-10-04 RX ADMIN — HYDROXYZINE HYDROCHLORIDE 25 MG: 25 TABLET ORAL at 21:30

## 2021-10-04 RX ADMIN — VANCOMYCIN HYDROCHLORIDE 1750 MG: 1 INJECTION, POWDER, LYOPHILIZED, FOR SOLUTION INTRAVENOUS at 21:30

## 2021-10-04 NOTE — PLAN OF CARE
Pt up IND in room, sleeping most of day.  Dressing CDI. Plan to discharge once abx course complete.

## 2021-10-04 NOTE — PROGRESS NOTES
[facility]  Hospitalist Progress Note  Wale Thakur MD  10/04/2021    Assessment & Plan   Zander Blunt is a 35 year old mal with PMHx of depression, anxiety and substance abuse including IV drug use who was admitted on 9/23/2021 after undergoing an emergent surgery for septic right first metatarsophalangeal joint infection with orthopedic surgery     Septic arthritis of the first metatarsophalangeal joint dt staph aureus, s/p I&D 9/23/21  * Preceding current foot infection, patient had an infection in his arm (a week prior) related to IV drug use. He was given doxycycline which he took for a week prior to presentation. He then developed his current foot/toe issues. He does not inject in his foot.   * Had been seen in ortho clinic on 9/22. At that time, ortho thought his foot pain was gout flare and had cortisone injected after aspiration. Fluid cx showed 389k WBC and staph aureus; patient was directed to come to hospital for intervention.   * Underwent I&D of right first MTP on 9/23 per ortho.  * Blood cultures remained negative this stay.  * Echo this stay: EF 60-65%, mild concentric LVH, RV normal. LV mildly dilated, no valvular vegetations seen. IVC normal in size.  * HIV neg, Hep B Ab+, hep C nonreactive.  * Initially managed with IV Ancef, changed to Vancomycin 9/25 after wound cultures were found to grow staph aureus (MRSA).   * Discharge abx plan complicated given hx of IV drug use -- not candidate for home infusion, not candidate for any TCUs  -- ID now planning to complete 2wk course of IV Vanco through 10/10, then transition to linezolid po x2 wks (covered by insurance)  -- will remain in hospital to complete course of Vanco  -- routine postop cares per ortho including, dressing changes; WBAT with postop shoe  -- not needing anything for pain postop  -- Overall remain stable, continue IV Vancomycin till 10/10     Depression  Anxiety  * Chronic and stable on home meds  including Xanax and Trazodone  * Psych consulted per patient request given death of his father on 9/27  * Placed on phenobarb taper and benzos dc'd per Dr. Washington on 9/29  -- consider psych reconsult as needed     Anemia, likely chronic  Hgb low but stable at 12 this stay.      Hx Substance abuse  Hx IV drug use. Reportedly treated in 2006/2007 per records  Met with chem dep counselor earlier this stay. Will follow up after discharge for outpatient CD treatment.     FEN: no IVFs, lytes stable, regular diet  DVT Prophylaxis: PCDs  Code Status: Full Code     Disposition: Will remain hospitalized to complete course of Vanco (will cont through 10/10). Discharge home on 10/11.     Interval History   -- no acute overnight issues  -- stable vitals    -Data reviewed today: I reviewed all new labs and imaging over the last 24 hours. I personally reviewed no images or EKG's today.    Physical Exam    , Blood pressure 127/72, pulse 81, temperature 98  F (36.7  C), resp. rate 18, weight 84 kg (185 lb 3.2 oz), SpO2 98 %.  Vitals:    09/28/21 0627 10/02/21 0644 10/03/21 0652   Weight: 86 kg (189 lb 9.6 oz) 84.6 kg (186 lb 6.4 oz) 84 kg (185 lb 3.2 oz)     Vital Signs with Ranges  Temp:  [98  F (36.7  C)-98.2  F (36.8  C)] 98  F (36.7  C)  Pulse:  [72-81] 81  Resp:  [18] 18  BP: (127-134)/(72-78) 127/72  SpO2:  [98 %-100 %] 98 %  I/O's Last 24 hours  I/O last 3 completed shifts:  In: 500 [P.O.:500]  Out: -          Medications   All medications were reviewed.      acetaminophen  975 mg Oral Q8H     mirtazapine  15 mg Oral At Bedtime     nicotine  1 patch Transdermal Daily     nicotine   Transdermal Q8H     polyethylene glycol  17 g Oral Daily     senna-docusate  1 tablet Oral BID     sodium chloride (PF)  3 mL Intracatheter Q8H     vancomycin  1,750 mg Intravenous Q12H        Data   Recent Labs   Lab 10/04/21  0640 10/03/21  1538 09/30/21  0727   CR 0.79 0.80 0.82       No results found for this or any previous visit (from the  past 24 hour(s)).    Wale Thakur MD  Text Page  (7am to 6pm)

## 2021-10-04 NOTE — PROVIDER NOTIFICATION
Hospitalist cross cover note    Paged re: patient requesting nicotine patch.  Ordered.    Michelle Olsen), PA-C  Hospitalist KARTHIK

## 2021-10-04 NOTE — PROGRESS NOTES
Alert and oriented x 4. VSS. CMS intact. Dressing to R foot CDI. Ambulates independently with post op shoe on. Denied any pain/discomfort. Nicotine patch on L arm.

## 2021-10-05 PROCEDURE — 250N000011 HC RX IP 250 OP 636: Performed by: INTERNAL MEDICINE

## 2021-10-05 PROCEDURE — 250N000013 HC RX MED GY IP 250 OP 250 PS 637: Performed by: PSYCHIATRY & NEUROLOGY

## 2021-10-05 PROCEDURE — 258N000003 HC RX IP 258 OP 636: Performed by: INTERNAL MEDICINE

## 2021-10-05 PROCEDURE — 99232 SBSQ HOSP IP/OBS MODERATE 35: CPT | Performed by: INTERNAL MEDICINE

## 2021-10-05 PROCEDURE — 250N000013 HC RX MED GY IP 250 OP 250 PS 637: Performed by: INTERNAL MEDICINE

## 2021-10-05 PROCEDURE — 120N000001 HC R&B MED SURG/OB

## 2021-10-05 RX ADMIN — TRAZODONE HYDROCHLORIDE 100 MG: 50 TABLET ORAL at 02:46

## 2021-10-05 RX ADMIN — ACETAMINOPHEN 975 MG: 325 TABLET, FILM COATED ORAL at 21:42

## 2021-10-05 RX ADMIN — ACETAMINOPHEN 975 MG: 325 TABLET, FILM COATED ORAL at 14:51

## 2021-10-05 RX ADMIN — VANCOMYCIN HYDROCHLORIDE 1750 MG: 1 INJECTION, POWDER, LYOPHILIZED, FOR SOLUTION INTRAVENOUS at 23:05

## 2021-10-05 RX ADMIN — VANCOMYCIN HYDROCHLORIDE 1750 MG: 1 INJECTION, POWDER, LYOPHILIZED, FOR SOLUTION INTRAVENOUS at 11:20

## 2021-10-05 RX ADMIN — MIRTAZAPINE 15 MG: 15 TABLET, FILM COATED ORAL at 21:43

## 2021-10-05 NOTE — PLAN OF CARE
Pt is alert & oriented x4. VSS on RA, afebrile. Independent in the room. Up walking in the hallway, ortho shoe in place. CMS is intact and dressing is CDI. Will discharge once IV antibiotics are complete.

## 2021-10-05 NOTE — PLAN OF CARE
VSS, declines any pain meds. Up independently, ambulated in the halls. Dressing to right foot is C/D/I

## 2021-10-05 NOTE — PLAN OF CARE
A/Ox4, VSS on RA. Independent in room. Pain is managed by scheduled Tylenol. CMS intact and dressing CDI. Plan is to discharge him once done with antibiotic.

## 2021-10-05 NOTE — PROGRESS NOTES
[facility]    HOSPITALIST PROGRESS NOTE :   --------------------------------------------------    Date of Admission:  9/23/2021    Cumulative Summary: Zander Blunt is a 35 year old mal with PMHx of depression, anxiety and substance abuse including IV drug use who was admitted on 9/23/2021 after undergoing an emergent surgery for septic right first metatarsophalangeal joint infection with orthopedic surgery.    Assessment & Plan     Septic arthritis of the first metatarsophalangeal joint dt staph aureus, s/p I&D 9/23/21  * Preceding current foot infection, patient had an infection in his arm (a week prior) related to IV drug use. He was given doxycycline which he took for a week prior to presentation. He then developed his current foot/toe issues. He does not inject in his foot.   * Had been seen in ortho clinic on 9/22. At that time, ortho thought his foot pain was gout flare and had cortisone injected after aspiration. Fluid cx showed 389k WBC and staph aureus; patient was directed to come to hospital for intervention.   * Underwent I&D of right first MTP on 9/23 per ortho.  * Blood cultures remained negative this stay.  * Echo this stay: EF 60-65%, mild concentric LVH, RV normal. LV mildly dilated, no valvular vegetations seen. IVC normal in size.  * HIV neg, Hep B Ab+, hep C nonreactive.  * Initially managed with IV Ancef, changed to Vancomycin 9/25 after wound cultures were found to grow staph aureus (MRSA).   * Discharge abx plan complicated given hx of IV drug use -- not candidate for home infusion, not candidate for any TCUs    -- Patient care was assumed this morning , seen and examined, was very sleepy but was able to wakeup to answer few questions   -- ID now planning to complete 2wk course of IV Vanco through 10/10, then transition to linezolid po x2 wks (covered by insurance)  -- will remain in hospital to complete course of Vanco till 10/10  -- routine postop cares per ortho  including, dressing changes; WBAT with postop shoe  -- not needing anything for pain postop    Depression  Anxiety  * Chronic and stable on home meds including Xanax and Trazodone  * Psych consulted per patient request given death of his father on 9/27  * Placed on phenobarb taper and benzos dc'd per Dr. Washington on 9/29  -- consider psych reconsult as needed  -- Continue Mirtazapine at bedtime      Anemia, likely chronic  -- Hgb low but stable close to 11.6      Hx Substance abuse  Hx IV drug use. Reportedly treated in 2006/2007 per records  -- Met with chem dep counselor earlier this stay. Will follow up after discharge for outpatient CD treatment.    Diet: Combination Diet Regular Diet Adult    Coleman Catheter: Not present  DVT Prophylaxis: Pneumatic Compression Devices  Code Status: Full Code    The patient's care was discussed with the Patient.    Disposition Plan   Expected discharge: 10/11/2021   recommended to prior living arrangement once once done with IV antibiotics .  Entered: Thea Valenzuela MD 10/05/2021, 10:22 AM     Thea Valenzuela MD, FACP  Text Page (7am - 6pm)    ----------------------------------------------------------------------------------------------------------------------    Interval History   Patient care was assumed this morning , seen and examined, feeling well.  Denying any fever or chills , aware about plan for IV antibiotics     -Data reviewed today: I reviewed all new labs and imaging results over the last 24 hours.    I personally reviewed no images or EKG's today.    Physical Exam   Temp: 97.9  F (36.6  C) Temp src: Oral BP: 99/66 Pulse: 64   Resp: 16 SpO2: 97 % O2 Device: None (Room air)    Vitals:    09/28/21 0627 10/02/21 0644 10/03/21 0652   Weight: 86 kg (189 lb 9.6 oz) 84.6 kg (186 lb 6.4 oz) 84 kg (185 lb 3.2 oz)     Vital Signs with Ranges  Temp:  [97.9  F (36.6  C)-98.1  F (36.7  C)] 97.9  F (36.6  C)  Pulse:  [59-87] 64  Resp:  [16] 16  BP: ()/(66-72) 99/66  SpO2:  [97  %-99 %] 97 %  No intake/output data recorded.    GENERAL: Alert , awake and oriented. NAD. Conversational, appropriate.   HEENT: Normocephalic. EOMI. No icterus or injection. Nares normal.   LUNGS: Clear to auscultation. No dyspnea at rest.   HEART: Regular rate. Extremities perfused.   ABDOMEN: Soft, nontender, and nondistended. Positive bowel sounds.   EXTREMITIES: No LE edema noted.   NEUROLOGIC: Moves extremities x4 on command. No acute focal neurologic abnormalities noted.     Medications       acetaminophen  975 mg Oral Q8H     mirtazapine  15 mg Oral At Bedtime     nicotine  1 patch Transdermal Daily     nicotine   Transdermal Q8H     polyethylene glycol  17 g Oral Daily     senna-docusate  1 tablet Oral BID     sodium chloride (PF)  3 mL Intracatheter Q8H     vancomycin  1,750 mg Intravenous Q12H       Data   Recent Labs   Lab 10/04/21  0640 10/03/21  1538 09/30/21  0727   CR 0.79 0.80 0.82       Imaging:   No results found for this or any previous visit (from the past 24 hour(s)).

## 2021-10-05 NOTE — PROGRESS NOTES
Care Management Follow Up    Length of Stay (days): 12    Expected Discharge Date: 10/11/2021     Concerns to be Addressed: homelessness, mental health, substance/tobacco abuse/use     Patient plan of care discussed at interdisciplinary rounds: Yes    Anticipated Discharge Disposition: Other (Comments)     Anticipated Discharge Services:    Anticipated Discharge DME:      Patient/family educated on Medicare website which has current facility and service quality ratings: no  Education Provided on the Discharge Plan:    Patient/Family in Agreement with the Plan:      Referrals Placed by CM/SW:    Private pay costs discussed: Not applicable    Additional Information:  Writer asked for emailed information from chem dep assessment as it's noted that it was sent to the unit SW. Email sent to KASHMIR Carlos requesting this as writer was not the SW that day. Writer met with patient who reported that he would like a Rule 25 done and referrals to Out Patient treatment.     Writer was provided out patient information sheets and printed them for patient. When writer provided information, patient noted that he would consider inpatient treatment and was wondering the time frame for discharge from hospital to CD program. Writer sent additional email to Tatum asking this question.       MALLORIE Ashley

## 2021-10-06 LAB
BACTERIA SNV CULT: NORMAL
VANCOMYCIN SERPL-MCNC: 10.9 MG/L

## 2021-10-06 PROCEDURE — 80202 ASSAY OF VANCOMYCIN: CPT | Performed by: INTERNAL MEDICINE

## 2021-10-06 PROCEDURE — 250N000011 HC RX IP 250 OP 636: Performed by: INTERNAL MEDICINE

## 2021-10-06 PROCEDURE — 250N000013 HC RX MED GY IP 250 OP 250 PS 637: Performed by: PHYSICIAN ASSISTANT

## 2021-10-06 PROCEDURE — 120N000001 HC R&B MED SURG/OB

## 2021-10-06 PROCEDURE — 36415 COLL VENOUS BLD VENIPUNCTURE: CPT | Performed by: INTERNAL MEDICINE

## 2021-10-06 PROCEDURE — 258N000003 HC RX IP 258 OP 636: Performed by: INTERNAL MEDICINE

## 2021-10-06 PROCEDURE — 250N000013 HC RX MED GY IP 250 OP 250 PS 637: Performed by: INTERNAL MEDICINE

## 2021-10-06 PROCEDURE — 99232 SBSQ HOSP IP/OBS MODERATE 35: CPT | Performed by: INTERNAL MEDICINE

## 2021-10-06 RX ADMIN — VANCOMYCIN HYDROCHLORIDE 1750 MG: 1 INJECTION, POWDER, LYOPHILIZED, FOR SOLUTION INTRAVENOUS at 11:39

## 2021-10-06 RX ADMIN — TRAZODONE HYDROCHLORIDE 100 MG: 50 TABLET ORAL at 21:18

## 2021-10-06 RX ADMIN — NICOTINE 1 PATCH: 21 PATCH, EXTENDED RELEASE TRANSDERMAL at 21:18

## 2021-10-06 NOTE — PLAN OF CARE
Patient vital signs are at baseline: Yes  Patient able to ambulate as they were prior to admission or with assist devices provided by therapies during their stay:  Yes Independent, ambulates in halls frequently.  Patient MUST void prior to discharge:  Yes  Patient able to tolerate oral intake:  Yes intake and output adequate   Pain has adequate pain control using Oral analgesics:  Yes, pt. Does not request any pain medications/has minimal pain; interventions declined, scheduled tylenol given.     Plan to discharge 10/10, when antibiotic course is finished. Interested in chemical dependency program, social work following. Continue to monitor.

## 2021-10-06 NOTE — PHARMACY-VANCOMYCIN DOSING SERVICE
Pharmacy Vancomycin Note  Date of Service 2021  Patient's  1985   35 year old, male    Indication: Osteomyelitis  Day of Therapy: 13  Current vancomycin regimen:  1750 mg IV q12h  Current vancomycin monitoring method: AUC  Current vancomycin therapeutic monitoring goal: 400-600 mg*h/L    Current estimated CrCl = Estimated Creatinine Clearance: 152.1 mL/min (based on SCr of 0.79 mg/dL).    Creatinine for last 3 days  10/3/2021:  3:38 PM Creatinine 0.80 mg/dL  10/4/2021:  6:40 AM Creatinine 0.79 mg/dL    Recent Vancomycin Levels (past 3 days)  10/6/2021:  9:29 AM Vancomycin 10.9 mg/L    Vancomycin IV Administrations (past 72 hours)                   vancomycin (VANCOCIN) 1,750 mg in sodium chloride 0.9 % 500 mL intermittent infusion (mg) 1,750 mg Given 10/05/21 2305     1,750 mg Given  1120     1,750 mg Given 10/04/21 2130     1,750 mg Given  1040     1,750 mg Given 10/03/21 2159                Nephrotoxins and other renal medications (From now, onward)    Start     Dose/Rate Route Frequency Ordered Stop    10/04/21 0000  ibuprofen (ADVIL/MOTRIN) 600 MG tablet      600 mg Oral EVERY 6 HOURS PRN 10/04/21 1238      21 1400  vancomycin (VANCOCIN) 1,750 mg in sodium chloride 0.9 % 500 mL intermittent infusion      1,750 mg  over 2 Hours Intravenous EVERY 12 HOURS 21 1340      21 2147  ibuprofen (ADVIL/MOTRIN) tablet 600 mg      600 mg Oral EVERY 6 HOURS PRN 21 2147               Contrast Orders - past 72 hours (72h ago, onward)    None          Interpretation of levels and current regimen:  Vancomycin level is reflective of -600    Has serum creatinine changed greater than 50% in last 72 hours: No    Urine output:  good urine output    Renal Function: Stable    Loading dose: N/A  Regimen: 1750 mg IV every 12 hours.  Start time: 11:11 on 10/06/2021  Exposure target: AUC24 (range)400-600 mg/L.hr   AUC24,ss: 476 mg/L.hr  Probability of AUC24 > 400: 86 %  Ctrough,ss: 10.6  mg/L  Probability of Ctrough,ss > 20: 0 %  Probability of nephrotoxicity (Lodise HAO 2009): 6 %    Plan:  1. Continue Current Dose  2. Vancomycin monitoring method: AUC  3. Vancomycin therapeutic monitoring goal: 400-600 mg*h/L  4. Pharmacy will check vancomycin levels as appropriate in 3-5 Days.  5. Serum creatinine levels will be ordered a minimum of twice weekly.    Elizabeth Workman, LauraD

## 2021-10-06 NOTE — PROGRESS NOTES
[facility]    HOSPITALIST PROGRESS NOTE :   --------------------------------------------------    Date of Admission:  9/23/2021    Cumulative Summary: Zander Blunt is a 35 year old mal with PMHx of depression, anxiety and substance abuse including IV drug use who was admitted on 9/23/2021 after undergoing an emergent surgery for septic right first metatarsophalangeal joint infection with orthopedic surgery.    Assessment & Plan     Septic arthritis of the first metatarsophalangeal joint dt staph aureus, s/p I&D 9/23/21  * Preceding current foot infection, patient had an infection in his arm (a week prior) related to IV drug use. He was given doxycycline which he took for a week prior to presentation. He then developed his current foot/toe issues. He does not inject in his foot.   * Had been seen in ortho clinic on 9/22. At that time, ortho thought his foot pain was gout flare and had cortisone injected after aspiration. Fluid cx showed 389k WBC and staph aureus; patient was directed to come to hospital for intervention.   * Underwent I&D of right first MTP on 9/23 per ortho.  * Blood cultures remained negative this stay.  * Echo this stay: EF 60-65%, mild concentric LVH, RV normal. LV mildly dilated, no valvular vegetations seen. IVC normal in size.  * HIV neg, Hep B Ab+, hep C nonreactive.  * Initially managed with IV Ancef, changed to Vancomycin 9/25 after wound cultures were found to grow staph aureus (MRSA).   * Discharge abx plan complicated given hx of IV drug use -- not candidate for home infusion, not candidate for any TCU's    --Patient was seen and examined, initially was sleeping but then woke up, feeling better, denying any fever or chills.  --Plan for completing 2-week course of IV vancomycin through 10/10, then transition to oral doxycycline 100 mg p.o. twice daily for 2 weeks after completion of IV therapy.  --Per infectious disease initially linezolid was recommended but  would avoid as it will require lab monitoring and there is concern for compliance therefore doxycycline would be safer choice.  --Patient will remain in hospital to complete course of vancomycin till October 10.  -- routine postop cares per ortho including, dressing changes; WBAT with postop shoe  -- not needing anything for pain postop  -- Infectous disease has signed off.  --Orthopedic surgery was planning to check patient on postop day 14 if patient is still in the hospital, will remind Orthopedics if they would like to evaluate patient tomorrow .    Depression  Anxiety  * Chronic and stable on home meds including Xanax and Trazodone  * Psych consulted per patient request given death of his father on 9/27  * Placed on phenobarb taper and benzos dc'd per Dr. Washington on 9/29    -- consider psych reconsult as needed, currently stable   -- Continue Mirtazapine at bedtime      Anemia, likely chronic  -- Hgb low but stable close to 11.6      Hx Substance abuse  Hx IV drug use. Reportedly treated in 2006/2007 per records  -- Met with chem dep counselor earlier this stay. Will follow up after discharge for outpatient CD treatment.    Diet: Combination Diet Regular Diet Adult    Coleman Catheter: Not present  DVT Prophylaxis: Pneumatic Compression Devices, will encourage patient to increase mobilization while patient is hospitalized due to prolonged his stay.  Code Status: Full Code    The patient's care was discussed with the Patient.    Disposition Plan   Expected discharge: 10/11/2021   recommended to prior living arrangement once done with IV antibiotics .  Entered: Thea Valenzuela MD 10/06/2021, 1:53 PM     Thea Valenzuela MD, FACP  Text Page (7am - 6pm)    ----------------------------------------------------------------------------------------------------------------------    Interval History    Patient was seen and examined, lying in bed, feeling well.  Denying any fever or chills, aware about plan for continuation of  IV antibiotics for the next few days to complete 14-day course.  Encourage him to increase his mobilization while patient is in the hospital.    -Data reviewed today: I reviewed all new labs and imaging results over the last 24 hours.    I personally reviewed no images or EKG's today.    Physical Exam   Temp: 98.6  F (37  C) Temp src: Oral BP: 94/55 Pulse: 60   Resp: 17 SpO2: 98 % O2 Device: None (Room air)    Vitals:    09/28/21 0627 10/02/21 0644 10/03/21 0652   Weight: 86 kg (189 lb 9.6 oz) 84.6 kg (186 lb 6.4 oz) 84 kg (185 lb 3.2 oz)     Vital Signs with Ranges  Temp:  [98.6  F (37  C)] 98.6  F (37  C)  Pulse:  [60-67] 60  Resp:  [16-17] 17  BP: ()/(55-87) 94/55  SpO2:  [98 %-100 %] 98 %  I/O last 3 completed shifts:  In: 600 [P.O.:600]  Out: -     GENERAL: Alert , awake and oriented. NAD. Conversational, appropriate.   HEENT: Normocephalic. EOMI. No icterus or injection. Nares normal.   LUNGS: Clear to auscultation. No dyspnea at rest.   HEART: Regular rate. Extremities perfused.   ABDOMEN: Soft, nontender, and nondistended. Positive bowel sounds.   EXTREMITIES: No LE edema noted.   NEUROLOGIC: Moves extremities x4 on command. No acute focal neurologic abnormalities noted.     Medications       acetaminophen  975 mg Oral Q8H     mirtazapine  15 mg Oral At Bedtime     nicotine  1 patch Transdermal Daily     nicotine   Transdermal Q8H     polyethylene glycol  17 g Oral Daily     senna-docusate  1 tablet Oral BID     sodium chloride (PF)  3 mL Intracatheter Q8H     vancomycin  1,750 mg Intravenous Q12H       Data   Recent Labs   Lab 10/04/21  0640 10/03/21  1538 09/30/21  0727   CR 0.79 0.80 0.82       Imaging:   No results found for this or any previous visit (from the past 24 hour(s)).

## 2021-10-06 NOTE — PROGRESS NOTES
[facility]    Infectious Disease Progress Note    Date of Service (when I saw the patient): 10/06/2021     Assessment:  1. S.aureus (MRSA) septic arthritis r 1st MTP joint , likely related to hematogenous seeding -substance abuse history is the risk factor for infection. Blood cxs negative thus far. No evidence of endocarditis  2. Chronic anemia  3. Chronic medical conditions - ADHD, history of right leg cellulitis, chemical dependency, depression, hypertension, panic disorder     Recommendations:  1. Continue Vancomycin. Dose adjustment per Pharm-D  2. IV vancomycin for 2 weeks until 10/10  then transition to oral therapy. Will transition to oral Doxycycline 100 mg PO BID X 2 weeks after completion of IV therapy (Initially recommended Linezolid which will require lab monitoring and worry about compliance.Therefore feel Doxycycline will be safer)  3. Labs appear stable    ID will sign off, please call us back as needed      Danay Campbell MD    Interval History   Resting, no new complaints.  Antimicrobial therapy  9/5 Vancomycin    Physical Exam   Temp: 98.6  F (37  C) Temp src: Oral BP: 94/55 Pulse: 60   Resp: 17 SpO2: 98 % O2 Device: None (Room air)    Vitals:    09/28/21 0627 10/02/21 0644 10/03/21 0652   Weight: 86 kg (189 lb 9.6 oz) 84.6 kg (186 lb 6.4 oz) 84 kg (185 lb 3.2 oz)     Vital Signs with Ranges  Temp:  [98.6  F (37  C)] 98.6  F (37  C)  Pulse:  [60-67] 60  Resp:  [16-17] 17  BP: ()/(55-87) 94/55  SpO2:  [98 %-100 %] 98 %      Constitutional: Awake, alert, cooperative, no apparent distress  Lungs: Clear to auscultation bilaterally, no crackles or wheezing  Cardiovascular: Regular rate and rhythm, normal S1 and S2, and no murmur noted  Abdomen: Normal bowel sounds, soft, non-distended, non-tender  Skin: No rashes, no cyanosis, no edema  MS: R foot surgical site intact, sutures in place, no erythema or tenderness    Other:    Medications       acetaminophen  975 mg Oral Q8H      mirtazapine  15 mg Oral At Bedtime     nicotine  1 patch Transdermal Daily     nicotine   Transdermal Q8H     polyethylene glycol  17 g Oral Daily     senna-docusate  1 tablet Oral BID     sodium chloride (PF)  3 mL Intracatheter Q8H     vancomycin  1,750 mg Intravenous Q12H       Data   All microbiology laboratory data reviewed.  Recent Labs   Lab Test 09/25/21  0546 09/24/21  0928 09/23/21  1926   WBC 4.5 5.7 5.8   HGB 11.8* 12.2* 12.3*   HCT 36.2* 37.4* 37.4*   MCV 85 85 84    278 252     Recent Labs   Lab Test 10/04/21  0640 10/03/21  1538 09/30/21  0727   CR 0.79 0.80 0.82     Recent Labs   Lab Test 09/25/21  0546   SED 61*     Micro  9/22 R foot synovial fluid aspirate -MRSA  9/23 R foot wound MRSA  Blood cx 9/23, 9/24 negative                   Staphylococcus aureus MRSA       GERRY       Clindamycin <=0.25 ug/mL Susceptible 1       Erythromycin >=8.0 ug/mL Resistant       Gentamicin <=0.5 ug/mL Susceptible       Linezolid 2.0 ug/mL Susceptible       Oxacillin >=4.0 ug/mL Resistant       Tetracycline <=1.0 ug/mL Susceptible       Trimethoprim/Sulfamethoxazole <=0.5/9.5 u... Susceptible       Vancomycin 1.0 ug/mL Susceptible

## 2021-10-06 NOTE — PROGRESS NOTES
A/Ox4, VSS on RA. Independent .  Np c/o pain. CMS intact and dressing CDI, patient likes to have the incision ALVARO while in bed.Plan is to discharge him once done with antibiotic.

## 2021-10-07 LAB
CREAT SERPL-MCNC: 0.72 MG/DL (ref 0.66–1.25)
GFR SERPL CREATININE-BSD FRML MDRD: >90 ML/MIN/1.73M2

## 2021-10-07 PROCEDURE — 258N000003 HC RX IP 258 OP 636: Performed by: INTERNAL MEDICINE

## 2021-10-07 PROCEDURE — 99232 SBSQ HOSP IP/OBS MODERATE 35: CPT | Performed by: INTERNAL MEDICINE

## 2021-10-07 PROCEDURE — 36415 COLL VENOUS BLD VENIPUNCTURE: CPT

## 2021-10-07 PROCEDURE — 120N000001 HC R&B MED SURG/OB

## 2021-10-07 PROCEDURE — 250N000011 HC RX IP 250 OP 636: Performed by: INTERNAL MEDICINE

## 2021-10-07 PROCEDURE — 82565 ASSAY OF CREATININE: CPT

## 2021-10-07 RX ADMIN — VANCOMYCIN HYDROCHLORIDE 1750 MG: 1 INJECTION, POWDER, LYOPHILIZED, FOR SOLUTION INTRAVENOUS at 00:43

## 2021-10-07 RX ADMIN — VANCOMYCIN HYDROCHLORIDE 1750 MG: 1 INJECTION, POWDER, LYOPHILIZED, FOR SOLUTION INTRAVENOUS at 22:23

## 2021-10-07 RX ADMIN — VANCOMYCIN HYDROCHLORIDE 1750 MG: 1 INJECTION, POWDER, LYOPHILIZED, FOR SOLUTION INTRAVENOUS at 13:13

## 2021-10-07 NOTE — PLAN OF CARE
Pt is AOx4, CMS intact, VSS, pt refused dressing on foot, edu provided, sutures intact, no swelling, no erythema, denies pain, plan to get abx until 10/10, and possible chem/dep discharge.

## 2021-10-07 NOTE — PROGRESS NOTES
BRIEF NUTRITION Follow up    CURRENT DIET AND INTAKE:  Diet: Regular Diet         Continues to eat fine - 100% of most meals, 25-75% at times. Orders up to 4-6 separate trays each day. 2-3 of these are balanced meals.     ANTHROPOMETRICS:  Height: 6'  Weight:  185 lbs 3.2 oz (84 kg)   Body mass index is 25.12 kg/m .   Weight Status: Overweight BMI 25-29.9  IBW:  80.9 kg   %IBW: 104%  Weight History: Weight over admission has been relatively stable. 9/27: 86 kg.   Wt Readings from Last 10 Encounters:   10/03/21 84 kg (185 lb 3.2 oz)   07/09/21 97.5 kg (215 lb)   03/25/18 87.5 kg (193 lb)   06/21/16 87.8 kg (193 lb 8 oz)   03/25/15 93 kg (205 lb)   07/03/14 83.9 kg (185 lb)   02/24/14 90.7 kg (200 lb)       LABS:  Reviewed    MALNUTRITION:  Visual Nutrition Focused Physical Assessment (NFPA) completed - no muscle/fat losses noted. (done 9/24)   Patient does not meet two of the following criteria necessary for diagnosing malnutrition.      % Weight Loss:  None noted  % Intake:  No decreased intake noted  Subcutaneous Fat Loss:  None observed  Muscle Loss:  None observed  Fluid Retention:  None noted    NUTRITION INTERVENTION:  Nutrition Diagnosis:  No nutrition diagnosis at this time.    Implementation:  Nutrition Education: Per Provider order if indicated    FOLLOW UP/MONITORING:   RD will sign off. Please reconsult as needed.     Alessandra Granger RD,   [facility], 64, 53, [facility]   Pager: [phone]  Weekend Pager: [phone]

## 2021-10-07 NOTE — PLAN OF CARE
Pt is alert and oriented. VSS on RA, afebrile, denies pain. Up independently. CMS intact and incision ALVARO, pt declines dressing. IV SL. Will continue to monitor.

## 2021-10-07 NOTE — PROGRESS NOTES
Care Management Follow Up    Length of Stay (days): 14    Expected Discharge Date: 10/11/2021     Concerns to be Addressed: homelessness, mental health, substance/tobacco abuse/use     Patient plan of care discussed at interdisciplinary rounds: Yes    Anticipated Discharge Disposition: Other (Comments)     Anticipated Discharge Services:    Anticipated Discharge DME:      Patient/family educated on Medicare website which has current facility and service quality ratings: no  Education Provided on the Discharge Plan:    Patient/Family in Agreement with the Plan:      Referrals Placed by CM/SW:    Private pay costs discussed: Not applicable    Additional Information:  Received a response from CD but needed clarification regarding whether they would accept before patient done with antibiotics or after. Waiting for response.       MALLORIE Ashley

## 2021-10-07 NOTE — PROGRESS NOTES
[facility]    HOSPITALIST PROGRESS NOTE :   --------------------------------------------------    Date of Admission:  9/23/2021    Cumulative Summary: Zander Blunt is a 35 year old mal with PMHx of depression, anxiety and substance abuse including IV drug use who was admitted on 9/23/2021 after undergoing an emergent surgery for septic right first metatarsophalangeal joint infection with orthopedic surgery.    Assessment & Plan     Septic arthritis of the first metatarsophalangeal joint dt staph aureus, s/p I&D 9/23/21  * Preceding current foot infection, patient had an infection in his arm (a week prior) related to IV drug use. He was given doxycycline which he took for a week prior to presentation. He then developed his current foot/toe issues. He does not inject in his foot.   * Had been seen in ortho clinic on 9/22. At that time, ortho thought his foot pain was gout flare and had cortisone injected after aspiration. Fluid cx showed 389k WBC and staph aureus; patient was directed to come to hospital for intervention.   * Underwent I&D of right first MTP on 9/23 per ortho.  * Blood cultures remained negative this stay.  * Echo this stay: EF 60-65%, mild concentric LVH, RV normal. LV mildly dilated, no valvular vegetations seen. IVC normal in size.  * HIV neg, Hep B Ab+, hep C nonreactive.  * Initially managed with IV Ancef, changed to Vancomycin 9/25 after wound cultures were found to grow staph aureus (MRSA).   * Discharge abx plan complicated given hx of IV drug use -- not candidate for home infusion, not candidate for any TCU's    --Patient was seen and examined, feeling clinically well, denying any fever or chills.  -- Patient is requesting his vancomycin timings to be changed slightly so he is not getting the infusion after midnight , discussed with bedside nursing who will update pharmacy and nursing   --Plan for completing 2-week course of IV vancomycin through 10/10, then  transition to oral doxycycline 100 mg p.o. twice daily for 2 weeks after completion of IV therapy.  --Per infectious disease initially linezolid was recommended but would avoid as it will require lab monitoring and there is concern for compliance therefore doxycycline would be safer choice.  --Patient did have some questions regarding doxycycline use for osteomyelitis, all the questions were answered.  --Patient will remain in hospital to complete course of vancomycin till October 10.  -- routine postop cares per ortho including, dressing changes; WBAT with postop shoe  -- not needing anything for pain postop  -- Infectous disease has signed off.  --Orthopedic surgery was planning to check patient on postop day 14 if patient is still in the hospital, they are planning to remove the stitches today.    Depression  Anxiety  * Chronic and stable on home meds including Xanax and Trazodone  * Psych consulted per patient request given death of his father on 9/27  * Placed on phenobarb taper and benzos dc'd per Dr. Washington on 9/29    -- consider psych reconsult as needed, currently stable   -- Continue Mirtazapine at bedtime      Anemia, likely chronic  -- Hgb low but stable close to 11.6      Hx Substance abuse  Hx IV drug use. Reportedly treated in 2006/2007 per records  -- Met with chem dep counselor earlier this stay. Will follow up after discharge for outpatient CD treatment.    Diet: Combination Diet Regular Diet Adult    Coleman Catheter: Not present  DVT Prophylaxis: Pneumatic Compression Devices, will encourage patient to increase mobilization while patient is hospitalized due to prolonged his stay.  Code Status: Full Code    The patient's care was discussed with the Patient.    Disposition Plan   Expected discharge: 10/11/2021   recommended to prior living arrangement once done with IV antibiotics .  Entered: Thea Valenzuela MD 10/07/2021, 10:56 AM     Thea Valenzuela MD, FACP  Text Page (7am -  6pm)    ----------------------------------------------------------------------------------------------------------------------    Interval History    Patient was seen and examined, in bed, feeling well, denying any fever or chills.  Patient is requesting his nighttime antibiotic timings to be changed slightly so he is not his staying up at the nighttime when antibiotic is getting infused from night to 2 AM.  I did discuss the plan with bedside nursing also.    -Data reviewed today: I reviewed all new labs and imaging results over the last 24 hours.    I personally reviewed no images or EKG's today.    Physical Exam   Temp: 98  F (36.7  C) Temp src: Temporal BP: 120/67 Pulse: 58   Resp: 16 SpO2: 95 % O2 Device: None (Room air)    Vitals:    09/28/21 0627 10/02/21 0644 10/03/21 0652   Weight: 86 kg (189 lb 9.6 oz) 84.6 kg (186 lb 6.4 oz) 84 kg (185 lb 3.2 oz)     Vital Signs with Ranges  Temp:  [98  F (36.7  C)-98.4  F (36.9  C)] 98  F (36.7  C)  Pulse:  [58-66] 58  Resp:  [16] 16  BP: (104-120)/(60-67) 120/67  SpO2:  [95 %-98 %] 95 %  I/O last 3 completed shifts:  In: 500 [P.O.:500]  Out: -     GENERAL: Alert , awake and oriented. NAD. Conversational, appropriate.   HEENT: Normocephalic. EOMI. No icterus or injection. Nares normal.   LUNGS: Clear to auscultation. No dyspnea at rest.   HEART: Regular rate. Extremities perfused.   ABDOMEN: Soft, nontender, and nondistended. Positive bowel sounds.   EXTREMITIES: No LE edema noted.   NEUROLOGIC: Moves extremities x4 on command. No acute focal neurologic abnormalities noted.     Medications       acetaminophen  975 mg Oral Q8H     mirtazapine  15 mg Oral At Bedtime     nicotine  1 patch Transdermal Daily     nicotine   Transdermal Q8H     polyethylene glycol  17 g Oral Daily     senna-docusate  1 tablet Oral BID     sodium chloride (PF)  3 mL Intracatheter Q8H     vancomycin  1,750 mg Intravenous Q12H       Data   Recent Labs   Lab 10/07/21  0719 10/04/21  0640  10/03/21  1538   CR 0.72 0.79 0.80       Imaging:   No results found for this or any previous visit (from the past 24 hour(s)).

## 2021-10-07 NOTE — PLAN OF CARE
Pt A&Ox4. VSS. CMS to RLE intact, open to air. Independent. IV replaced this afternoon. Vanco infusing, site WDL. Tolerating PO, voiding adequately. Precautions maintained. Continue abx regimen/ supportive cares.

## 2021-10-07 NOTE — PROGRESS NOTES
Orthopedic Surgery  Zander Blunt  10/7/2021  Admit Date:  2021  POD 14 Days Post-Op  S/P Procedure(s):  IRRIGATION AND DEBRIDEMENT, RIGHT FIRST METATARSAL PHALANGEAL JOINT    Patient resting comfortably in bed.    Pain controlled.  Tolerating oral intake.    Denies nausea or vomiting  Denies chest pain or shortness of breath  No events overnight.     Alert and orient to person, place, and time.  Vital Sign Ranges  Temperature Temp  Av.2  F (36.8  C)  Min: 98  F (36.7  C)  Max: 98.4  F (36.9  C)   Blood pressure Systolic (24hrs), Av , Min:104 , Max:120        Diastolic (24hrs), Av, Min:60, Max:67      Pulse Pulse  Av  Min: 58  Max: 66   Respirations Resp  Av  Min: 16  Max: 16   Pulse oximetry SpO2  Av.5 %  Min: 95 %  Max: 98 %       Right 1st MTP with no erythema or swelling  Incision well healed  Sutures intact, simple interrupted, removed without issue  No drainage or bleeding  Bilateral calves are soft, non-tender.  Bilateral lower extremity is NVI.  Sensation intact bilateral lower extremities  5/5 motor with resisted dorsi and plantar flexion bilaterally  +Dp pulse    Labs:  Recent Labs   Lab Test 21  0928 21  1810   POTASSIUM 4.2 4.1 4.4     Recent Labs   Lab Test 21  0546 21  0928 21   HGB 11.8* 12.2* 12.3*     No results for input(s): INR in the last 91757 hours.  Recent Labs   Lab Test 21  0546 21  0928 21    278 252     A/P  1. S/p 1st toe MTP I&D (septic native joint)              Continue amb and SCDs for DVT prophylaxis.                Mobilize with PT/OT               WBAT with regular shoes - no need to use post op shoe anymore              May leave healing incision open to air              IV abx per ID.                Continue current pain regimen.                  2. Disposition              Anticipate d/c to home based on abx plan. Ortho stable  Karissa Ruffin PA-C

## 2021-10-08 PROCEDURE — 120N000001 HC R&B MED SURG/OB

## 2021-10-08 PROCEDURE — 250N000013 HC RX MED GY IP 250 OP 250 PS 637: Performed by: PHYSICIAN ASSISTANT

## 2021-10-08 PROCEDURE — 250N000013 HC RX MED GY IP 250 OP 250 PS 637: Performed by: ORTHOPAEDIC SURGERY

## 2021-10-08 PROCEDURE — 99232 SBSQ HOSP IP/OBS MODERATE 35: CPT | Performed by: INTERNAL MEDICINE

## 2021-10-08 PROCEDURE — 250N000011 HC RX IP 250 OP 636: Performed by: INTERNAL MEDICINE

## 2021-10-08 PROCEDURE — 250N000013 HC RX MED GY IP 250 OP 250 PS 637: Performed by: PSYCHIATRY & NEUROLOGY

## 2021-10-08 PROCEDURE — 250N000013 HC RX MED GY IP 250 OP 250 PS 637: Performed by: INTERNAL MEDICINE

## 2021-10-08 PROCEDURE — 258N000003 HC RX IP 258 OP 636: Performed by: INTERNAL MEDICINE

## 2021-10-08 RX ADMIN — VANCOMYCIN HYDROCHLORIDE 1750 MG: 1 INJECTION, POWDER, LYOPHILIZED, FOR SOLUTION INTRAVENOUS at 21:13

## 2021-10-08 RX ADMIN — VANCOMYCIN HYDROCHLORIDE 1750 MG: 1 INJECTION, POWDER, LYOPHILIZED, FOR SOLUTION INTRAVENOUS at 00:07

## 2021-10-08 RX ADMIN — TRAZODONE HYDROCHLORIDE 100 MG: 50 TABLET ORAL at 21:33

## 2021-10-08 RX ADMIN — NICOTINE 1 PATCH: 21 PATCH, EXTENDED RELEASE TRANSDERMAL at 21:14

## 2021-10-08 RX ADMIN — VANCOMYCIN HYDROCHLORIDE 1750 MG: 1 INJECTION, POWDER, LYOPHILIZED, FOR SOLUTION INTRAVENOUS at 10:48

## 2021-10-08 NOTE — PROGRESS NOTES
"Care Management Follow Up    Length of Stay (days): 15    Expected Discharge Date: 10/11/2021     Concerns to be Addressed: homelessness, mental health, substance/tobacco abuse/use     Patient plan of care discussed at interdisciplinary rounds: Yes    Anticipated Discharge Disposition: Other (Comments)     Anticipated Discharge Services:    Anticipated Discharge DME:      Patient/family educated on Medicare website which has current facility and service quality ratings: no  Education Provided on the Discharge Plan:    Patient/Family in Agreement with the Plan:      Referrals Placed by CM/SW:    Private pay costs discussed: Not applicable    Additional Information:  Writer provided outpatient information provided by [place] with [facility]. Patient stated that he will discharge and do some outpatient and then intends to check into [facility] as they have \"reasonable self pay quotes\". Patient will update if he has any other SW needs.      MALLORIE Ashley        "

## 2021-10-08 NOTE — PLAN OF CARE
Patient AxOx4, VSS on room air, lungs are clear.  Patient up independently walking in halls most of the shift.  Denies pain.  CMS+, Incision on right foot ALVARO-sutures removed today.  Tolerating regular diet well.  Patient stopped his IV vancomycin because his IV was sore and did not tell the nurse.  New IV obtained by flying squad RN-restarted.  Patient is looking forward to discharge as soon as antibiotic therapy complete.

## 2021-10-08 NOTE — PLAN OF CARE
Patient alert and oriented, independent, here to complete IV antibiotic, regular diet, declined vital signs, iv saline lock. Right foot dressing clean and intact.

## 2021-10-08 NOTE — PROGRESS NOTES
A/Ox4, VSS on RA. Independent .No c/o pain. CMS intact .Incision ALVARO.Plan is to discharge home once done with antibiotic.

## 2021-10-08 NOTE — PROGRESS NOTES
[facility]    HOSPITALIST PROGRESS NOTE :   --------------------------------------------------    Date of Admission:  9/23/2021    Cumulative Summary: Zander Blunt is a 35 year old mal with PMHx of depression, anxiety and substance abuse including IV drug use who was admitted on 9/23/2021 after undergoing an emergent surgery for septic right first metatarsophalangeal joint infection with orthopedic surgery.    Assessment & Plan     Septic arthritis of the first metatarsophalangeal joint dt staph aureus, s/p I&D 9/23/21  * Preceding current foot infection, patient had an infection in his arm (a week prior) related to IV drug use. He was given doxycycline which he took for a week prior to presentation. He then developed his current foot/toe issues. He does not inject in his foot.   * Had been seen in ortho clinic on 9/22. At that time, ortho thought his foot pain was gout flare and had cortisone injected after aspiration. Fluid cx showed 389k WBC and staph aureus; patient was directed to come to hospital for intervention.   * Underwent I&D of right first MTP on 9/23 per ortho.  * Blood cultures remained negative this stay.  * Echo this stay: EF 60-65%, mild concentric LVH, RV normal. LV mildly dilated, no valvular vegetations seen. IVC normal in size.  * HIV neg, Hep B Ab+, hep C nonreactive.  * Initially managed with IV Ancef, changed to Vancomycin 9/25 after wound cultures were found to grow staph aureus (MRSA).   * Discharge abx plan complicated given hx of IV drug use -- not candidate for home infusion, not candidate for any TCU's    --Patient was seen and examined, feeling clinically well, denying any fever or chills.  --Discussed with bedside nursing regarding administrating nighttime vancomycin at 10 PM so patient can be done close to midnight.  -- Patient will be finishing his 2-week course of IV vancomycin on October 10, Monday morning.  --Patient will be switched to oral  doxycycline 100 mg p.o. twice daily for 2 weeks after the vancomycin.  --Patient was evaluated by orthopedic surgery yesterday, okay to be discharged from their point of view once patient is done with IV antibiotics.  --Infectious disease has signed off, patient will need follow-up with podiatry after the discharge.    Depression  Anxiety  * Chronic and stable on home meds including Xanax and Trazodone  * Psych consulted per patient request given death of his father on 9/27  * Placed on phenobarb taper and benzos dc'd per Dr. Washington on 9/29    -- consider psych reconsult as needed, currently stable   --Continue mirtazapine at bedtime     Anemia, likely chronic  -- Hgb low but stable close to 11.6      Hx Substance abuse  Hx IV drug use. Reportedly treated in 2006/2007 per records  -- Met with chem dep counselor earlier this stay. Will follow up after discharge for outpatient CD treatment.    Diet: Combination Diet Regular Diet Adult    Coleman Catheter: Not present  DVT Prophylaxis: Pneumatic Compression Devices, will encourage patient to increase mobilization while patient is hospitalized due to prolonged his stay.  Code Status: Full Code    The patient's care was discussed with the Patient.    Disposition Plan   Expected discharge: 10/11/2021   recommended to prior living arrangement once done with IV antibiotics .  Patient is planning to undergo CD treatment as an outpatient which he is expecting in couple of weeks after the discharge.  Entered: Thea Valenzuela MD 10/08/2021, 3:14 PM     Thea Valenzuela MD, FACP  Text Page (7am - 6pm)    ----------------------------------------------------------------------------------------------------------------------    Interval History    Patient was seen and examined, sitting in bed, feeling well.  Patient is denying any fever, chills, has been tolerating the antibiotics aware about plan for IV antibiotics to be done on Monday morning and then switching to oral  doxycycline.  Patient is planning to go to outpatient chemical dependency treatment after the discharge.    -Data reviewed today: I reviewed all new labs and imaging results over the last 24 hours.    I personally reviewed no images or EKG's today.    Physical Exam   Temp: 97.8  F (36.6  C) Temp src: Oral BP: 112/61 Pulse: 61   Resp: 16 SpO2: 96 % O2 Device: None (Room air)    Vitals:    09/28/21 0627 10/02/21 0644 10/03/21 0652   Weight: 86 kg (189 lb 9.6 oz) 84.6 kg (186 lb 6.4 oz) 84 kg (185 lb 3.2 oz)     Vital Signs with Ranges  Temp:  [97.8  F (36.6  C)-99.2  F (37.3  C)] 97.8  F (36.6  C)  Pulse:  [61-84] 61  Resp:  [16] 16  BP: (112-138)/(61-89) 112/61  SpO2:  [96 %] 96 %  I/O last 3 completed shifts:  In: 500 [P.O.:500]  Out: -     GENERAL: Alert , awake and oriented. NAD. Conversational, appropriate.   HEENT: Normocephalic. EOMI. No icterus or injection. Nares normal.   LUNGS: Clear to auscultation. No dyspnea at rest.   HEART: Regular rate. Extremities perfused.   ABDOMEN: Soft, nontender, and nondistended. Positive bowel sounds.   EXTREMITIES: No LE edema noted.   NEUROLOGIC: Moves extremities x4 on command. No acute focal neurologic abnormalities noted.     Medications       acetaminophen  975 mg Oral Q8H     mirtazapine  15 mg Oral At Bedtime     nicotine  1 patch Transdermal Daily     nicotine   Transdermal Q8H     polyethylene glycol  17 g Oral Daily     senna-docusate  1 tablet Oral BID     sodium chloride (PF)  3 mL Intracatheter Q8H     vancomycin  1,750 mg Intravenous Q12H       Data   Recent Labs   Lab 10/07/21  0719 10/04/21  0640 10/03/21  1538   CR 0.72 0.79 0.80       Imaging:   No results found for this or any previous visit (from the past 24 hour(s)).

## 2021-10-09 LAB
BASOPHILS # BLD AUTO: 0 10E3/UL (ref 0–0.2)
BASOPHILS NFR BLD AUTO: 1 %
CRP SERPL-MCNC: 3.6 MG/L (ref 0–8)
EOSINOPHIL # BLD AUTO: 0.2 10E3/UL (ref 0–0.7)
EOSINOPHIL NFR BLD AUTO: 3 %
IMM GRANULOCYTES # BLD: 0 10E3/UL
IMM GRANULOCYTES NFR BLD: 0 %
LYMPHOCYTES # BLD AUTO: 1.7 10E3/UL (ref 0.8–5.3)
LYMPHOCYTES NFR BLD AUTO: 34 %
MONOCYTES # BLD AUTO: 0.4 10E3/UL (ref 0–1.3)
MONOCYTES NFR BLD AUTO: 9 %
NEUTROPHILS # BLD AUTO: 2.8 10E3/UL (ref 1.6–8.3)
NEUTROPHILS NFR BLD AUTO: 53 %
NRBC # BLD AUTO: 0 10E3/UL
NRBC BLD AUTO-RTO: 0 /100
WBC # BLD AUTO: 5.1 10E3/UL (ref 4–11)

## 2021-10-09 PROCEDURE — 250N000011 HC RX IP 250 OP 636: Performed by: INTERNAL MEDICINE

## 2021-10-09 PROCEDURE — 120N000001 HC R&B MED SURG/OB

## 2021-10-09 PROCEDURE — 258N000003 HC RX IP 258 OP 636: Performed by: INTERNAL MEDICINE

## 2021-10-09 PROCEDURE — 85048 AUTOMATED LEUKOCYTE COUNT: CPT | Performed by: INTERNAL MEDICINE

## 2021-10-09 PROCEDURE — 250N000013 HC RX MED GY IP 250 OP 250 PS 637: Performed by: INTERNAL MEDICINE

## 2021-10-09 PROCEDURE — 99232 SBSQ HOSP IP/OBS MODERATE 35: CPT | Performed by: INTERNAL MEDICINE

## 2021-10-09 PROCEDURE — 36415 COLL VENOUS BLD VENIPUNCTURE: CPT | Performed by: INTERNAL MEDICINE

## 2021-10-09 PROCEDURE — 86140 C-REACTIVE PROTEIN: CPT | Performed by: INTERNAL MEDICINE

## 2021-10-09 RX ADMIN — VANCOMYCIN HYDROCHLORIDE 1750 MG: 1 INJECTION, POWDER, LYOPHILIZED, FOR SOLUTION INTRAVENOUS at 10:37

## 2021-10-09 RX ADMIN — VANCOMYCIN HYDROCHLORIDE 1750 MG: 1 INJECTION, POWDER, LYOPHILIZED, FOR SOLUTION INTRAVENOUS at 21:57

## 2021-10-09 RX ADMIN — TRAZODONE HYDROCHLORIDE 100 MG: 50 TABLET ORAL at 21:56

## 2021-10-09 NOTE — PROGRESS NOTES
[facility]    HOSPITALIST PROGRESS NOTE :   --------------------------------------------------    Date of Admission:  9/23/2021    Cumulative Summary: Zander Blunt is a 35 year old mal with PMHx of depression, anxiety and substance abuse including IV drug use who was admitted on 9/23/2021 after undergoing an emergent surgery for septic right first metatarsophalangeal joint infection with orthopedic surgery.    Assessment & Plan     Septic arthritis of the first metatarsophalangeal joint dt staph aureus, s/p I&D 9/23/21  * Preceding current foot infection, patient had an infection in his arm (a week prior) related to IV drug use. He was given doxycycline which he took for a week prior to presentation. He then developed his current foot/toe issues. He does not inject in his foot.   * Had been seen in ortho clinic on 9/22. At that time, ortho thought his foot pain was gout flare and had cortisone injected after aspiration. Fluid cx showed 389k WBC and staph aureus; patient was directed to come to hospital for intervention.   * Underwent I&D of right first MTP on 9/23 per ortho.  * Blood cultures remained negative this stay.  * Echo this stay: EF 60-65%, mild concentric LVH, RV normal. LV mildly dilated, no valvular vegetations seen. IVC normal in size.  * HIV neg, Hep B Ab+, hep C nonreactive.  * Initially managed with IV Ancef, changed to Vancomycin 9/25 after wound cultures were found to grow staph aureus (MRSA).   * Discharge abx plan complicated given hx of IV drug use -- not candidate for home infusion, not candidate for any TCU's    --Patient was seen and examined, feeling clinically well, denying any fever or chills, no new complaints  --Patient has been tolerating IV vancomycin twice daily, will be finishing the 2-week course on Monday morning.  --Patient will be switched to oral doxycycline 100 mg p.o. twice daily for 2 weeks after the vancomycin.  --Patient was evaluated by  orthopedic surgery yesterday, okay to be discharged from their point of view once patient is done with IV antibiotics.  --Infectious disease has signed off, patient will need follow-up with podiatry after the discharge.    Depression  Anxiety  * Chronic and stable on home meds including Xanax and Trazodone  * Psych consulted per patient request given death of his father on 9/27  * Placed on phenobarb taper and benzos dc'd per Dr. Washington on 9/29    -- consider psych reconsult as needed, currently stable   --Continue mirtazapine at bedtime     Anemia, likely chronic  -- Hgb low but stable close to 11.6      Hx Substance abuse  Hx IV drug use. Reportedly treated in 2006/2007 per records  -- Met with chem dep counselor earlier this stay. Will follow up after discharge for outpatient CD treatment.    Diet: Combination Diet Regular Diet Adult    Coleman Catheter: Not present  DVT Prophylaxis: Pneumatic Compression Devices, will encourage patient to increase mobilization while patient is hospitalized due to prolonged his stay.  Code Status: Full Code    The patient's care was discussed with the Patient.    Disposition Plan   Expected discharge: 10/11/2021   recommended to prior living arrangement once done with IV antibiotics .  Patient is planning to undergo CD treatment as an outpatient which he is expecting in couple of weeks after the discharge.  Entered: Thea Valenzuela MD 10/09/2021, 10:36 AM     The[facility] Val[facility]uela MD, [facility]  Text Page (7am - 6pm)    ----------------------------------------------------------------------------------------------------------------------    Interval History    Patient was seen and examined, lying in bed, slightly sleepy this morning, bedside nursing also present.  Patient is denying any fever, chills, has been tolerating the antibiotics, aware that his antibiotics will be done on Monday morning and he will be then switched to oral doxycycline.  Patient is planning to go to outpatient chemical  dependency treatment after the discharge.  Patient is satisfied with change in timings of vancomycin so he is not getting vancomycin too late in the middle of the night.    -Data reviewed today: I reviewed all new labs and imaging results over the last 24 hours.    I personally reviewed no images or EKG's today.    Physical Exam   Temp: 97.6  F (36.4  C) Temp src: Axillary BP: 100/61 Pulse: 58   Resp: 18 SpO2: 97 % O2 Device: None (Room air)    Vitals:    10/02/21 0644 10/03/21 0652 10/09/21 0452   Weight: 84.6 kg (186 lb 6.4 oz) 84 kg (185 lb 3.2 oz) 88.7 kg (195 lb 8.8 oz)     Vital Signs with Ranges  Temp:  [97.6  F (36.4  C)-98.8  F (37.1  C)] 97.6  F (36.4  C)  Pulse:  [58-80] 58  Resp:  [16-18] 18  BP: ()/(61) 100/61  SpO2:  [96 %-98 %] 97 %  No intake/output data recorded.    GENERAL: Alert , awake and oriented. NAD. Conversational, appropriate.   HEENT: Normocephalic. EOMI. No icterus or injection. Nares normal.   LUNGS: Clear to auscultation. No dyspnea at rest.   HEART: Regular rate. Extremities perfused.   ABDOMEN: Soft, nontender, and nondistended. Positive bowel sounds.   EXTREMITIES: No LE edema noted.   NEUROLOGIC: Moves extremities x4 on command. No acute focal neurologic abnormalities noted.     Medications       acetaminophen  975 mg Oral Q8H     mirtazapine  15 mg Oral At Bedtime     nicotine  1 patch Transdermal Daily     nicotine   Transdermal Q8H     polyethylene glycol  17 g Oral Daily     senna-docusate  1 tablet Oral BID     sodium chloride (PF)  3 mL Intracatheter Q8H     vancomycin  1,750 mg Intravenous Q12H       Data   Recent Labs   Lab 10/09/21  0729 10/07/21  0719 10/04/21  0640 10/03/21  1538   WBC 5.1  --   --   --    CR  --  0.72 0.79 0.80       Imaging:   No results found for this or any previous visit (from the past 24 hour(s)).

## 2021-10-09 NOTE — PLAN OF CARE
Pt is A/O x4. Soft BP, encouraged po intake. Other VSS, on RA. Denies pain. Declilned focused foot assessment and covid test. Tolerating regular diet. PIV SL with intermittent IV abx. Up independently, ambulating halls. Voiding adequately in BR. Reports having a BM today. Prn trazodone given at bedtime per pt request. Plan to discharge home on Monday.

## 2021-10-09 NOTE — PLAN OF CARE
Pt is A/O x4. Soft BP, encouraged po intake. Other VSS- from 2200 as he declines Declined scheduled Tylenol. Denies pain. ON vitals, on RA. Denies pain. R foot CDI, declined covid test. Tolerating regular diet. PIV SL with intermittent IV abx. Up independently, ambulating halls. Voiding adequately in BR. Reports having a BM 10/8. Prn trazodone given at bedtime per pt request. Plan to discharge home on Monday.

## 2021-10-10 VITALS
RESPIRATION RATE: 18 BRPM | HEART RATE: 80 BPM | DIASTOLIC BLOOD PRESSURE: 67 MMHG | SYSTOLIC BLOOD PRESSURE: 102 MMHG | OXYGEN SATURATION: 97 % | WEIGHT: 195.55 LBS | BODY MASS INDEX: 26.52 KG/M2 | TEMPERATURE: 98.7 F

## 2021-10-10 PROCEDURE — 99238 HOSP IP/OBS DSCHRG MGMT 30/<: CPT | Performed by: INTERNAL MEDICINE

## 2021-10-10 PROCEDURE — 250N000013 HC RX MED GY IP 250 OP 250 PS 637: Performed by: INTERNAL MEDICINE

## 2021-10-10 RX ORDER — DOXYCYCLINE 100 MG/1
100 CAPSULE ORAL EVERY 12 HOURS SCHEDULED
Status: DISCONTINUED | OUTPATIENT
Start: 2021-10-10 | End: 2021-10-10 | Stop reason: HOSPADM

## 2021-10-10 RX ORDER — DOXYCYCLINE 100 MG/1
100 CAPSULE ORAL EVERY 12 HOURS
Qty: 28 CAPSULE | Refills: 0 | Status: SHIPPED | OUTPATIENT
Start: 2021-10-10 | End: 2021-10-24

## 2021-10-10 RX ORDER — MIRTAZAPINE 15 MG/1
15 TABLET, FILM COATED ORAL AT BEDTIME
Qty: 30 TABLET | Refills: 0 | Status: SHIPPED | OUTPATIENT
Start: 2021-10-10

## 2021-10-10 RX ADMIN — DOXYCYCLINE HYCLATE 100 MG: 100 CAPSULE ORAL at 10:50

## 2021-10-10 NOTE — PLAN OF CARE
VSS on RA.  Independent. Isolation precautions in place.  Finishing antibiotic treatments and plan is to discharge home 10/10.

## 2021-10-10 NOTE — DISCHARGE SUMMARY
[facility]  Hospitalist Discharge Summary      Date of Admission:  9/23/2021  Date of Discharge:  10/10/2021  Discharging Provider: Thea Valenzuela MD. FACP    Discharge Diagnoses   Septic arthritis of the first metacarpal phalangeal joint due to staph aureus, s/p incision and drainage in September 23, 2021.  History of depression and anxiety.  Anemia, chronic.  History of substance abuse.      Follow-ups Needed After Discharge   Follow-up Appointments     Follow-up and recommended labs and tests      Follow-up with Dr Oconnell at [facility] 6 weeks following your surgery.   To arrange appointment or questions call: the care coordinator at   [phone]  Main [facility] phone number: [phone]  Main [facility] phone number: [phone]             Unresulted Labs Ordered in the Past 30 Days of this Admission     No orders found from 8/24/2021 to 9/24/2021.          Discharge Disposition   Discharged to home  Condition at discharge: Stable      Hospital Course   Cumulative Summary: Zander Blunt is a 35 year old mal with PMHx of depression, anxiety and substance abuse including IV drug use who was admitted on 9/23/2021 after undergoing an emergent surgery for septic right first metatarsophalangeal joint infection with orthopedic surgery.  Here are further details regarding his current hospitalization     Septic arthritis of the first metatarsophalangeal joint dt staph aureus, s/p I&D 9/23/21  * Preceding current foot infection, patient had an infection in his arm (a week prior) related to IV drug use. He was given doxycycline which he took for a week prior to presentation. He then developed his current foot/toe issues. He does not inject in his foot.   * Had been seen in ortho clinic on 9/22. At that time, ortho thought his foot pain was gout flare and had cortisone injected after aspiration. Fluid cx showed 389k WBC and staph aureus; patient was directed to come to hospital for  intervention.   * Underwent I&D of right first MTP on 9/23 per ortho.  * Blood cultures remained negative this stay.  * Echo this stay: EF 60-65%, mild concentric LVH, RV normal. LV mildly dilated, no valvular vegetations seen. IVC normal in size.  * HIV neg, Hep B Ab+, hep C nonreactive.  * Initially managed with IV Ancef, changed to Vancomycin 9/25 after wound cultures were found to grow staph aureus (MRSA).   * Discharge abx plan complicated given hx of IV drug use -- not candidate for home infusion, not candidate for any TCU's    --Patient was seen and examined, feeling clinically well, denying any new fever or chills.  --Patient has completed his 14-day course of IV vancomycin this morning, patient will be started on oral doxycycline 100 mg p.o. twice daily as recommended by infectious disease.  --Patient also have a scheduled follow-up with orthopedic surgery after the discharge.  --During the hospitalization patient was also evaluated by infectious disease who have signed off at this point.     Depression  Anxiety  * Chronic and stable on home meds including Xanax and Trazodone  * Psych consulted per patient request given death of his father on 9/27  * Placed on phenobarb taper and benzos dc'd per Dr. Washington on 9/29     --Patient was evaluated by psych, currently stable, he is recommended to have follow-up with psych as an outpatient as needed.  --Continue mirtazapine at bedtime, will go ahead and will give patient prescription for discharge although patient has been refusing the mirtazapine in the hospital.     Anemia, likely chronic  -- Hgb low but stable close to 11.6      Hx Substance abuse  Hx IV drug use. Reportedly treated in 2006/2007 per records  -- Met with chem dep counselor earlier this stay. Will follow up after discharge for outpatient CD treatment.  --Patient has not been using any pain medications during the hospital.    Patient was seen and examined on the day of discharge , he is feeling  well, does not have any complaints , I did review the discharge medications and instructions with the patient and plan for him to follow up with the PCP after the hospitalization .patient was in agreement , he is discharged in stable condition back to his home.       Consultations This Hospital Stay   INFECTIOUS DISEASES IP CONSULT  PHYSICAL THERAPY ADULT IP CONSULT  INFECTIOUS DISEASES IP CONSULT  CHEMICAL DEPENDENCY IP CONSULT  PHARMACY TO DOSE VANCO  CARE MANAGEMENT / SOCIAL WORK IP CONSULT  PHARMACY LIAISON FOR MEDICATION COVERAGE CONSULT  PSYCHIATRY IP CONSULT  PHARMACY LIAISON FOR MEDICATION COVERAGE CONSULT  PSYCHIATRY IP CONSULT  CHEMICAL DEPENDENCY IP CONSULT  OCCUPATIONAL THERAPY ADULT IP CONSULT    Code Status   Full Code    Time Spent on this Encounter   I, Thea Valenzuela MD, personally saw the patient today and spent less than or equal to 30 minutes discharging this patient.     Thea Valenzuela MD  [facility] ORTHOPEDICS [facility]  [address] [address]  Phone: [phone]  Fax: [phone]  ______________________________________________________________________    Physical Exam   Vital Signs: Temp: 98.7  F (37.1  C) Temp src: Oral BP: 102/67 Pulse: 80   Resp: 18 SpO2: 97 % O2 Device: None (Room air)    Weight: 195 lbs 8.77 oz    Physical Exam  Vitals and nursing note reviewed.   Constitutional:       Appearance: He is well-developed.   HENT:      Head: Normocephalic and atraumatic.   Eyes:      Pupils: Pupils are equal, round, and reactive to light.   Neck:      Thyroid: No thyromegaly.   Cardiovascular:      Rate and Rhythm: Normal rate and regular rhythm.      Heart sounds: Normal heart sounds.   Pulmonary:      Effort: Pulmonary effort is normal. No respiratory distress.      Breath sounds: Normal breath sounds.   Abdominal:      General: Bowel sounds are normal. There is no distension.      Palpations: Abdomen is soft.   Musculoskeletal:         General: No tenderness.  Normal range of motion.      Cervical back: Normal range of motion and neck supple.   Skin:     General: Skin is warm and dry.   Neurological:      Mental Status: He is alert and oriented to person, place, and time.   Psychiatric:         Behavior: Behavior normal.          Primary Care Physician   Physician No Ref-Primary    Discharge Orders      Reason for your hospital stay    Right septic 1st MTP joint     Follow-up and recommended labs and tests    Follow-up with Dr Oconnell at [facility] 6 weeks following your surgery.   To arrange appointment or questions call: the care coordinator at [phone]  [facility] phone number: [phone]  [facility] phone number: [phone]     Activity    Your activity upon discharge: WBAT Right foot.  Post-op shoe or rigid soled tennis shoe when ambulating     When to contact your care team    Call your provider if you have any of the following: temperature greater than 101,  increased shortness of breath, increased drainage, redness or increasing calf pain/cramping.     Wound care and dressings    Instructions to care for your wound at home: Keep wound clean and dry.  Okay to shower over foot.  Do not immerse until incision fully approximated.     Activity    Your activity upon discharge: activity as tolerated and no driving for today     Rolling Knee Walker DME    DME Documentation: Describe the reason for need to support medical necessity: Impaired gait due to Foot/Ankle surgery. Anticipated length of need: 3 months     Crutches DME    DME Documentation: Describe the reason for need to support medical necessity: Impaired gait due to Foot/Ankle surgery. Anticipated length of need: 3 months     Diet    Follow this diet upon discharge: Orders Placed This Encounter      Combination Diet Regular Diet Adult         Significant Results and Procedures   Results for orders placed or performed during the hospital encounter of 09/23/21   Echocardiogram Complete     Value     LVEF  60-65%    [facility]    [id number]  [id number]  [id number]  [id number]^JAIME^ANCELMO^ORI     [facility]  Echocardiography Laboratory  [address] [place] [address]     Name: MELLY CHRISTIAN  MRN: [id number]  : 1985  Study Date: 2021 08:16 AM  Age: 35 yrs  Gender: Male  Patient Location: [facility]  Reason For Study: Endocarditis  Ordering Physician: ANCELMO SANDOVAL  Performed By: Migel Leung     BSA: 2.1 m2  Height: 72 in  Weight: 193 lb  HR: 67  BP: 116/72 mmHg  ______________________________________________________________________________  Procedure  Complete Portable Echo Adult.  ______________________________________________________________________________  Interpretation Summary     The visual ejection fraction is 60-65%. There is mild concentric left  ventricular hypertrophy.  The right ventricle is normal in size and function.  The left atrium is mildly dilated.  No significant valve disease. No obvious evidence of valvular vegetations on  these TTE images.  The inferior vena cava was normal in size with preserved respiratory  variability.  There is no pericardial effusion.  ______________________________________________________________________________  Left Ventricle  The left ventricle is normal in size. There is mild concentric left  ventricular hypertrophy. Left ventricular systolic function is normal. The  visual ejection fraction is 60-65%. Diastolic Doppler findings (E/E' ratio  and/or other parameters) suggest left ventricular filling pressures are  normal. No regional wall motion abnormalities noted.     Right Ventricle  The right ventricle is normal in size and function.     Atria  The left atrium is mildly dilated. Right atrial size is normal.     Mitral Valve  There is mild mitral annular calcification. The mitral valve is normal in  structure and function. There is trace mitral regurgitation. There is no  mitral valve stenosis.     Tricuspid  Valve  The tricuspid valve is normal in structure and function. The right ventricular  systolic pressure is approximated at 17.9 mmHg plus the right atrial pressure.     Aortic Valve  The aortic valve is trileaflet with aortic valve sclerosis. No aortic  regurgitation is present. No aortic stenosis is present.     Pulmonic Valve  The pulmonic valve is not well seen, but is grossly normal.     Vessels  The aortic root is normal size. The inferior vena cava was normal in size with  preserved respiratory variability.     Pericardium  There is no pericardial effusion.     ______________________________________________________________________________  MMode/2D Measurements & Calculations  IVSd: 1.2 cm  LVIDd: 4.8 cm  LVIDs: 2.9 cm  LVPWd: 1.3 cm  FS: 40.4 %     LV mass(C)d: 231.5 grams  LV mass(C)dI: 110.3 grams/m2  Ao root diam: 3.0 cm  LA dimension: 3.8 cm  asc Aorta Diam: 2.7 cm  LA/Ao: 1.3  LVOT diam: 2.2 cm  LVOT area: 3.9 cm2  LA Volume (BP): 75.8 ml  LA Volume Index (BP): 36.1 ml/m2  RWT: 0.52     Doppler Measurements & Calculations  MV E max geraldo: 83.4 cm/sec  MV A max geraldo: 50.9 cm/sec  MV E/A: 1.6     MV dec slope: 383.4 cm/sec2  PA acc time: 0.11 sec  TR max geraldo: 211.7 cm/sec  TR max P.9 mmHg  E/E' av.4  Lateral E/e': 6.7  Medial E/e': 8.0     ______________________________________________________________________________  Report approved by: Palak Caceres 2021 10:46 AM               Discharge Medications   Current Discharge Medication List      START taking these medications    Details   acetaminophen (TYLENOL) 325 MG tablet Take 2 tablets (650 mg) by mouth every 4 hours as needed for other (For optimal non-opioid multimodal pain management to improve pain control.)  Qty: 20 tablet, Refills: 0    Associated Diagnoses: Staphylococcal arthritis of right foot (H)      ibuprofen (ADVIL/MOTRIN) 600 MG tablet Take 1 tablet (600 mg) by mouth every 6 hours as needed for other (inflammatory  pain)  Qty: 20 tablet, Refills: 0    Associated Diagnoses: Staphylococcal arthritis of right foot (H)      mirtazapine (REMERON) 15 MG tablet Take 1 tablet (15 mg) by mouth At Bedtime  Qty: 30 tablet, Refills: 0    Comments: Future refills by PCP  Physician No Ref-Primary with phone number None.  Associated Diagnoses: Staphylococcal arthritis of right foot (H)         CONTINUE these medications which have CHANGED    Details   doxycycline hyclate (VIBRAMYCIN) 100 MG capsule Take 1 capsule (100 mg) by mouth every 12 hours for 14 days  Qty: 28 capsule, Refills: 0    Associated Diagnoses: Staphylococcal arthritis of right foot (H)         CONTINUE these medications which have NOT CHANGED    Details   albuterol (PROAIR HFA/PROVENTIL HFA/VENTOLIN HFA) 108 (90 Base) MCG/ACT inhaler Inhale 1-2 puffs into the lungs every 4 hours as needed for shortness of breath / dyspnea or wheezing      ALPRAZolam (XANAX) 1 MG tablet Take 1 mg by mouth 2 times daily as needed for anxiety      traZODone (DESYREL) 50 MG tablet Take  mg by mouth nightly as needed for sleep           Allergies   Allergies   Allergen Reactions     Gluten Meal Other (See Comments) and Cramps     Asthma

## 2021-10-10 NOTE — PROGRESS NOTES
Patient vital signs are at baseline: Yes  Patient able to ambulate as they were prior to admission or with assist devices provided by therapies during their stay:  Yes  Patient MUST void prior to discharge:  Yes  Patient able to tolerate oral intake:  Yes  Pain has adequate pain control using Oral analgesics:  Yes     Discharge delayed because pt slept until around 230pm.

## 2022-06-27 NOTE — PROGRESS NOTES
[facility]  Hospitalist Progress Note  Wale Thakur MD  10/03/2021    Assessment & Plan   Zander Blnut is a 35 year old mal with PMHx of depression, anxiety and substance abuse including IV drug use who was admitted on 9/23/2021 after undergoing an emergent surgery for septic right first metatarsophalangeal joint infection with orthopedic surgery     Septic arthritis of the first metatarsophalangeal joint dt staph aureus, s/p I&D 9/23/21  * Preceding current foot infection, patient had an infection in his arm (a week prior) related to IV drug use. He was given doxycycline which he took for a week prior to presentation. He then developed his current foot/toe issues. He does not inject in his foot.   * Had been seen in ortho clinic on 9/22. At that time, ortho thought his foot pain was gout flare and had cortisone injected after aspiration. Fluid cx showed 389k WBC and staph aureus; patient was directed to come to hospital for intervention.   * Underwent I&D of right first MTP on 9/23 per ortho.  * Blood cultures remained negative this stay.  * Echo this stay: EF 60-65%, mild concentric LVH, RV normal. LV mildly dilated, no valvular vegetations seen. IVC normal in size.  * HIV neg, Hep B Ab+, hep C nonreactive.  * Initially managed with IV Ancef, changed to Vancomycin 9/25 after wound cultures were found to grow staph aureus (MRSA).   * Discharge abx plan complicated given hx of IV drug use -- not candidate for home infusion, not candidate for any TCUs  -- ID now planning to complete 2wk course of IV Vanco through 10/10, then transition to linezolid po x2 wks (covered by insurance)  -- will remain in hospital to complete course of Vanco  -- routine postop cares per ortho including, dressing changes; WBAT with postop shoe  -- not needing anything for pain postop     Overall remain stable, continue IV Vancomycin till 10/10     Depression  Anxiety  * Chronic and stable on home meds  including Xanax and Trazodone  * Psych consulted per patient request given death of his father on 9/27  * Placed on phenobarb taper and benzos dc'd per Dr. Washington on 9/29  -- consider psych reconsult as needed     Anemia, likely chronic  Hgb low but stable at 12 this stay.      Hx Substance abuse  Hx IV drug use. Reportedly treated in 2006/2007 per records  Met with chem dep counselor earlier this stay. Will follow up after discharge for outpatient CD treatment.     FEN: no IVFs, lytes stable, regular diet  DVT Prophylaxis: PCDs  Code Status: Full Code     Disposition: Will remain hospitalized to complete course of Vanco (will cont through 10/10). Discharge home on 10/11.     Interval History   -- no acute overnight issues  -- stable vitals    -Data reviewed today: I reviewed all new labs and imaging over the last 24 hours. I personally reviewed no images or EKG's today.    Physical Exam    , Blood pressure 130/80, pulse 94, temperature 97.9  F (36.6  C), temperature source Oral, resp. rate 16, weight 84 kg (185 lb 3.2 oz), SpO2 96 %.  Vitals:    09/28/21 0627 10/02/21 0644 10/03/21 0652   Weight: 86 kg (189 lb 9.6 oz) 84.6 kg (186 lb 6.4 oz) 84 kg (185 lb 3.2 oz)     Vital Signs with Ranges  Temp:  [97.9  F (36.6  C)-98.2  F (36.8  C)] 97.9  F (36.6  C)  Pulse:  [94-97] 94  Resp:  [16] 16  BP: (123-130)/(75-80) 130/80  SpO2:  [96 %-99 %] 96 %  I/O's Last 24 hours  I/O last 3 completed shifts:  In: 240 [P.O.:240]  Out: -          Medications   All medications were reviewed.      acetaminophen  975 mg Oral Q8H     mirtazapine  15 mg Oral At Bedtime     PHENobarbital  32.4 mg Oral BID     [START ON 10/4/2021] PHENobarbital  32.4 mg Oral QAM AC     polyethylene glycol  17 g Oral Daily     senna-docusate  1 tablet Oral BID     sodium chloride (PF)  3 mL Intracatheter Q8H     vancomycin  1,750 mg Intravenous Q12H        Data   Recent Labs   Lab 09/30/21  0727 09/29/21 2024 09/28/21  0702   CR 0.82 0.88 0.91       No  results found for this or any previous visit (from the past 24 hour(s)).    Wale Thakur MD  Text Page  (7am to 6pm)        no wheezing/no dyspnea/no cough

## 2023-05-23 NOTE — PROGRESS NOTES
Orthopedic Surgery  Zander Blunt  2021  Admit Date:  2021  POD: 5 Days Post-Op   Procedure(s):  IRRIGATION AND DEBRIDEMENT, RIGHT FIRST METATARSAL PHALANGEAL JOINT    Alert and oriented to person, place, and time.  Patient resting comfortably in bed.    Pain controlled.  Tolerating oral intake.    Denies nausea or vomiting  Denies chest pain or shortness of breath    Vital Sign Ranges  Temperature Temp  Av.7  F (36.5  C)  Min: 97.2  F (36.2  C)  Max: 98.4  F (36.9  C)   Blood pressure Systolic (24hrs), Av , Min:115 , Max:129        Diastolic (24hrs), Av, Min:65, Max:77      Pulse Pulse  Av  Min: 59  Max: 62   Respirations Resp  Av  Min: 14  Max: 14   Pulse oximetry SpO2  Av.7 %  Min: 97 %  Max: 98 %       Dressing is clean, dry, and intact.   No erythema of the surrounding skin along distal toe.    Able to flex and extend the right great toe with minimal pain.    Bilateral calves are soft, non-tender.  Right lower extremity is NVI.  Sensation intact bilateral lower extremities  Patient able to resist dorsi and plantar flexion bilaterally  +Dp pulse    Labs:  Recent Labs   Lab Test 21  0928 21  1810   POTASSIUM 4.2 4.1 4.4     Recent Labs   Lab Test 21  0546 21  0928 21   HGB 11.8* 12.2* 12.3*     No results for input(s): INR in the last [id number] hours.  Recent Labs   Lab Test 21  0546 21  0928 21    278 252     A/P  1. S/p 1st toe MTP I&D (septic native joint)              Continue amb and SCDs for DVT prophylaxis.                Mobilize with PT/OT               WBAT with post-op shoe              Leave dressings intact              IV abx per ID.                Continue current pain regimen.     2. Disposition              Anticipate d/c to home based on abx plan. Ortho stable    Ebony Hernández PA-C     s/p US-guided paracentesis, drained 1700 ml of clear dark yellow ascites. ascites fluid specimens were sent for lab analysis. pt tolerated the procedure well. hemostasis secure and VSS.

## 2025-04-13 NOTE — PROGRESS NOTES
[facility]    Infectious Disease Progress Note    Date of Service : 10/01/2021     Assessment:  1. S.aureus (MRSA) septic arthritis r 1st MTP joint , likely related to hematogenous seeding -substance abuse history is the risk factor for infection. Blood cxs negative thus far. No evidence of endocarditis  2. Chronic anemia  3. Chronic medical conditions - ADHD, history of right leg cellulitis, chemical dependency, depression, hypertension, panic disorder     Recommendations:  1. Continue Vancomycin. Dose adjustment per Pharm-D  2. IV vancomycin for 2 weeks until 10/10  then transition to oral Linezolid 600 mg  PO BID for another 2 weeks  3. Check safety labs - CBC/diff weekly, creatinine and vancomycin levels twice weekly    Danay Campbell MD    Interval History   Resting, no new complaints, tolerating antibiotics without side effects. Pain controlled.    Antimicrobial therapy  9/5 Vancomycin    Physical Exam   Temp: 97.8  F (36.6  C) Temp src: Oral BP: 95/50 Pulse: 56   Resp: 16 SpO2: 97 % O2 Device: None (Room air)    Vitals:    09/24/21 0642 09/27/21 0628 09/28/21 0627   Weight: 87.9 kg (193 lb 12.6 oz) 86 kg (189 lb 9.5 oz) 86 kg (189 lb 9.6 oz)     Vital Signs with Ranges  Temp:  [97.8  F (36.6  C)-98.2  F (36.8  C)] 97.8  F (36.6  C)  Pulse:  [56-90] 56  Resp:  [16] 16  BP: ()/(50-96) 95/50  SpO2:  [97 %-100 %] 97 %    Constitutional: Awake, alert, cooperative, no apparent distress  Lungs: Clear to auscultation bilaterally, no crackles or wheezing  Cardiovascular: Regular rate and rhythm, normal S1 and S2, and no murmur noted  Abdomen: Normal bowel sounds, soft, non-distended, non-tender  Skin: No rashes, no cyanosis, no edema  MS: R foot in surgical dressing    Other:    Medications       acetaminophen  975 mg Oral Q8H     mirtazapine  15 mg Oral At Bedtime     PHENobarbital  32.4 mg Oral TID     [START ON 10/2/2021] PHENobarbital  32.4 mg Oral BID     [START ON 10/4/2021]  PHENobarbital  32.4 mg Oral QAM AC     polyethylene glycol  17 g Oral Daily     senna-docusate  1 tablet Oral BID     sodium chloride (PF)  3 mL Intracatheter Q8H     vancomycin  1,750 mg Intravenous Q12H     Data   All microbiology laboratory data reviewed.  Recent Labs   Lab Test 09/25/21  0546 09/24/21 0928 09/23/21  1926   WBC 4.5 5.7 5.8   HGB 11.8* 12.2* 12.3*   HCT 36.2* 37.4* 37.4*   MCV 85 85 84    278 252     Recent Labs   Lab Test 09/30/21  0727 09/29/21 2024 09/28/21  0702   CR 0.82 0.88 0.91     Recent Labs   Lab Test 09/25/21  0546   SED 61*     Micro  9/22 R foot synovial fluid aspirate -MRSA  9/23 R foot wound MRSA  Blood cx 9/23, 9/24 negative                   Staphylococcus aureus MRSA       GERRY       Clindamycin <=0.25 ug/mL Susceptible 1       Erythromycin >=8.0 ug/mL Resistant       Gentamicin <=0.5 ug/mL Susceptible       Linezolid 2.0 ug/mL Susceptible       Oxacillin >=4.0 ug/mL Resistant       Tetracycline <=1.0 ug/mL Susceptible       Trimethoprim/Sulfamethoxazole <=0.5/9.5 u... Susceptible       Vancomycin 1.0 ug/mL Susceptible                   Droplet

## (undated) DEVICE — KIT CULTURE ESWAB AEROBE/ANAEROBE WHITE TOP R723480

## (undated) DEVICE — SU VICRYL 0 CT-1 CR 8X18" J740D

## (undated) DEVICE — SOL WATER IRRIG 1000ML BOTTLE 2F7114

## (undated) DEVICE — TUBING IRRIG TUR Y TYPE 96" LF 6543-01

## (undated) DEVICE — GLOVE PROTEXIS MICRO 8.5  2D73PM85

## (undated) DEVICE — PACK EXTREMITY SOP15EXFSD

## (undated) DEVICE — SU MONOCRYL 3-0 PS-2 18" UND Y497G

## (undated) DEVICE — SOL NACL 0.9% IRRIG 3000ML BAG 2B7477

## (undated) DEVICE — ESU GROUND PAD ADULT W/CORD E7507

## (undated) DEVICE — LINEN TOWEL PACK X5 5464

## (undated) DEVICE — SOL NACL 0.9% IRRIG 1000ML BOTTLE 2F7124

## (undated) DEVICE — TOURNIQUET SGL BLADDER 18"X4" RED 5921-218-135

## (undated) DEVICE — TUBING SUCTION 12"X1/4" N612

## (undated) DEVICE — SOL NACL 0.9% IRRIG 3000ML BAG 07972-08

## (undated) DEVICE — SPECIMEN CULTURETTE DBL SWAB 220109

## (undated) DEVICE — SU ETHILON 3-0 PS-1 18" 1663G

## (undated) DEVICE — DRSG XEROFORM 5X9" 8884431605

## (undated) DEVICE — GOWN XXLG REINFORCED 9071EL

## (undated) RX ORDER — BUPIVACAINE HYDROCHLORIDE 2.5 MG/ML
INJECTION, SOLUTION EPIDURAL; INFILTRATION; INTRACAUDAL
Status: DISPENSED
Start: 2021-09-23

## (undated) RX ORDER — CEFAZOLIN SODIUM 2 G/100ML
INJECTION, SOLUTION INTRAVENOUS
Status: DISPENSED
Start: 2021-09-23

## (undated) RX ORDER — FENTANYL CITRATE 0.05 MG/ML
INJECTION, SOLUTION INTRAMUSCULAR; INTRAVENOUS
Status: DISPENSED
Start: 2021-09-23

## (undated) RX ORDER — PROPOFOL 10 MG/ML
INJECTION, EMULSION INTRAVENOUS
Status: DISPENSED
Start: 2021-09-23

## (undated) RX ORDER — FENTANYL CITRATE 50 UG/ML
INJECTION, SOLUTION INTRAMUSCULAR; INTRAVENOUS
Status: DISPENSED
Start: 2021-09-23